# Patient Record
Sex: FEMALE | Race: WHITE | NOT HISPANIC OR LATINO | Employment: FULL TIME | URBAN - METROPOLITAN AREA
[De-identification: names, ages, dates, MRNs, and addresses within clinical notes are randomized per-mention and may not be internally consistent; named-entity substitution may affect disease eponyms.]

---

## 2017-01-24 ENCOUNTER — ALLSCRIPTS OFFICE VISIT (OUTPATIENT)
Dept: OTHER | Facility: OTHER | Age: 37
End: 2017-01-24

## 2017-01-26 ENCOUNTER — GENERIC CONVERSION - ENCOUNTER (OUTPATIENT)
Dept: OTHER | Facility: OTHER | Age: 37
End: 2017-01-26

## 2017-01-27 ENCOUNTER — LAB CONVERSION - ENCOUNTER (OUTPATIENT)
Dept: OTHER | Facility: OTHER | Age: 37
End: 2017-01-27

## 2017-01-27 ENCOUNTER — GENERIC CONVERSION - ENCOUNTER (OUTPATIENT)
Dept: OTHER | Facility: OTHER | Age: 37
End: 2017-01-27

## 2017-01-27 LAB
A. VAGINALIS BY PCR (HISTORICAL): NOT DETECTED
A.VAGINALIS LOG (CELL/ML) (HISTORICAL): <3.25
BVAB 2 (HISTORICAL): NOT DETECTED
C. ALBICANS, DNA OR PCR (HISTORICAL): DETECTED
CANDIDA GENUS (HISTORICAL): DETECTED
CHLAMYDIA, LIQUID-BASED (HISTORICAL): DETECTED
GARDNERELLA BY MOL. METHOD (HISTORICAL): ABNORMAL
GARDNERELLA BY MOL. METHOD (HISTORICAL): DETECTED
LACTOBACILLUS (SPECIES) (HISTORICAL): <3.25
LACTOBACILLUS (SPECIES) (HISTORICAL): NOT DETECTED
MEGASPHAERA TYPE 1 (HISTORICAL): NOT DETECTED
TRICHOMONAS (HISTORICAL): NOT DETECTED

## 2017-02-06 ENCOUNTER — LAB REQUISITION (OUTPATIENT)
Dept: LAB | Facility: HOSPITAL | Age: 37
End: 2017-02-06
Payer: COMMERCIAL

## 2017-02-06 ENCOUNTER — ALLSCRIPTS OFFICE VISIT (OUTPATIENT)
Dept: OTHER | Facility: OTHER | Age: 37
End: 2017-02-06

## 2017-02-06 DIAGNOSIS — N73.0 ACUTE PARAMETRITIS AND PELVIC CELLULITIS: ICD-10-CM

## 2017-02-06 DIAGNOSIS — A74.9 CHLAMYDIAL INFECTION: ICD-10-CM

## 2017-02-06 PROCEDURE — 87086 URINE CULTURE/COLONY COUNT: CPT | Performed by: NURSE PRACTITIONER

## 2017-02-06 PROCEDURE — 87110 CHLAMYDIA CULTURE: CPT | Performed by: NURSE PRACTITIONER

## 2017-02-08 ENCOUNTER — GENERIC CONVERSION - ENCOUNTER (OUTPATIENT)
Dept: OTHER | Facility: OTHER | Age: 37
End: 2017-02-08

## 2017-02-08 LAB — BACTERIA UR CULT: NORMAL

## 2017-02-10 LAB — C TRACH SPEC QL CULT: NEGATIVE

## 2017-02-13 ENCOUNTER — GENERIC CONVERSION - ENCOUNTER (OUTPATIENT)
Dept: OTHER | Facility: OTHER | Age: 37
End: 2017-02-13

## 2017-02-20 ENCOUNTER — ALLSCRIPTS OFFICE VISIT (OUTPATIENT)
Dept: OTHER | Facility: OTHER | Age: 37
End: 2017-02-20

## 2017-02-20 ENCOUNTER — LAB REQUISITION (OUTPATIENT)
Dept: LAB | Facility: HOSPITAL | Age: 37
End: 2017-02-20
Payer: COMMERCIAL

## 2017-02-20 DIAGNOSIS — A74.9 CHLAMYDIAL INFECTION: ICD-10-CM

## 2017-02-20 DIAGNOSIS — N76.0 ACUTE VAGINITIS: ICD-10-CM

## 2017-02-20 PROCEDURE — 87510 GARDNER VAG DNA DIR PROBE: CPT | Performed by: NURSE PRACTITIONER

## 2017-02-20 PROCEDURE — 87110 CHLAMYDIA CULTURE: CPT | Performed by: NURSE PRACTITIONER

## 2017-02-20 PROCEDURE — 87660 TRICHOMONAS VAGIN DIR PROBE: CPT | Performed by: NURSE PRACTITIONER

## 2017-02-20 PROCEDURE — 87480 CANDIDA DNA DIR PROBE: CPT | Performed by: NURSE PRACTITIONER

## 2017-02-21 LAB
CANDIDA RRNA VAG QL PROBE: NEGATIVE
G VAGINALIS RRNA GENITAL QL PROBE: NEGATIVE
T VAGINALIS RRNA GENITAL QL PROBE: NEGATIVE

## 2017-02-23 LAB — C TRACH SPEC QL CULT: NEGATIVE

## 2017-02-24 ENCOUNTER — GENERIC CONVERSION - ENCOUNTER (OUTPATIENT)
Dept: OTHER | Facility: OTHER | Age: 37
End: 2017-02-24

## 2017-03-24 ENCOUNTER — GENERIC CONVERSION - ENCOUNTER (OUTPATIENT)
Dept: OTHER | Facility: OTHER | Age: 37
End: 2017-03-24

## 2017-03-27 ENCOUNTER — ALLSCRIPTS OFFICE VISIT (OUTPATIENT)
Dept: OTHER | Facility: OTHER | Age: 37
End: 2017-03-27

## 2017-03-27 ENCOUNTER — LAB REQUISITION (OUTPATIENT)
Dept: LAB | Facility: HOSPITAL | Age: 37
End: 2017-03-27
Payer: COMMERCIAL

## 2017-03-27 DIAGNOSIS — Z77.21 CONTACT WITH AND (SUSPECTED) EXPOSURE TO POTENTIALLY HAZARDOUS BODY FLUIDS: ICD-10-CM

## 2017-03-27 PROCEDURE — 87591 N.GONORRHOEAE DNA AMP PROB: CPT | Performed by: NURSE PRACTITIONER

## 2017-03-27 PROCEDURE — 87491 CHLMYD TRACH DNA AMP PROBE: CPT | Performed by: NURSE PRACTITIONER

## 2017-03-28 ENCOUNTER — GENERIC CONVERSION - ENCOUNTER (OUTPATIENT)
Dept: OTHER | Facility: OTHER | Age: 37
End: 2017-03-28

## 2017-03-29 LAB
CHLAMYDIA DNA CVX QL NAA+PROBE: NORMAL
N GONORRHOEA DNA GENITAL QL NAA+PROBE: NORMAL

## 2017-03-30 ENCOUNTER — TRANSCRIBE ORDERS (OUTPATIENT)
Dept: LAB | Facility: HOSPITAL | Age: 37
End: 2017-03-30

## 2017-03-30 ENCOUNTER — GENERIC CONVERSION - ENCOUNTER (OUTPATIENT)
Dept: OTHER | Facility: OTHER | Age: 37
End: 2017-03-30

## 2017-03-30 ENCOUNTER — APPOINTMENT (OUTPATIENT)
Dept: LAB | Facility: HOSPITAL | Age: 37
End: 2017-03-30
Payer: COMMERCIAL

## 2017-03-30 DIAGNOSIS — N76.0 ACUTE VAGINITIS: ICD-10-CM

## 2017-03-30 DIAGNOSIS — R10.2 PELVIC AND PERINEAL PAIN: ICD-10-CM

## 2017-03-30 DIAGNOSIS — R30.0 DYSURIA: ICD-10-CM

## 2017-03-30 LAB
BACTERIA UR QL AUTO: NORMAL /HPF
BILIRUB UR QL STRIP: NEGATIVE
CLARITY UR: CLEAR
COLOR UR: YELLOW
GLUCOSE UR STRIP-MCNC: NEGATIVE MG/DL
HGB UR QL STRIP.AUTO: ABNORMAL
HYALINE CASTS #/AREA URNS LPF: NORMAL /LPF
KETONES UR STRIP-MCNC: NEGATIVE MG/DL
LEUKOCYTE ESTERASE UR QL STRIP: NEGATIVE
NITRITE UR QL STRIP: NEGATIVE
NON-SQ EPI CELLS URNS QL MICRO: NORMAL /HPF
PH UR STRIP.AUTO: 6 [PH] (ref 4.5–8)
PROT UR STRIP-MCNC: NEGATIVE MG/DL
RBC #/AREA URNS AUTO: NORMAL /HPF
SP GR UR STRIP.AUTO: 1.03 (ref 1–1.03)
UROBILINOGEN UR QL STRIP.AUTO: 0.2 E.U./DL
WBC #/AREA URNS AUTO: NORMAL /HPF

## 2017-03-30 PROCEDURE — 87086 URINE CULTURE/COLONY COUNT: CPT

## 2017-03-30 PROCEDURE — 81001 URINALYSIS AUTO W/SCOPE: CPT

## 2017-03-31 LAB — BACTERIA UR CULT: NORMAL

## 2017-04-03 ENCOUNTER — GENERIC CONVERSION - ENCOUNTER (OUTPATIENT)
Dept: OTHER | Facility: OTHER | Age: 37
End: 2017-04-03

## 2017-04-04 ENCOUNTER — APPOINTMENT (EMERGENCY)
Dept: ULTRASOUND IMAGING | Facility: HOSPITAL | Age: 37
End: 2017-04-04
Payer: COMMERCIAL

## 2017-04-04 ENCOUNTER — ALLSCRIPTS OFFICE VISIT (OUTPATIENT)
Dept: OTHER | Facility: OTHER | Age: 37
End: 2017-04-04

## 2017-04-04 ENCOUNTER — APPOINTMENT (OUTPATIENT)
Dept: LAB | Facility: HOSPITAL | Age: 37
End: 2017-04-04
Payer: COMMERCIAL

## 2017-04-04 ENCOUNTER — HOSPITAL ENCOUNTER (EMERGENCY)
Facility: HOSPITAL | Age: 37
Discharge: HOME | End: 2017-04-04
Attending: EMERGENCY MEDICINE | Admitting: EMERGENCY MEDICINE
Payer: COMMERCIAL

## 2017-04-04 VITALS
RESPIRATION RATE: 16 BRPM | WEIGHT: 145.5 LBS | BODY MASS INDEX: 24.21 KG/M2 | TEMPERATURE: 98.6 F | SYSTOLIC BLOOD PRESSURE: 149 MMHG | DIASTOLIC BLOOD PRESSURE: 90 MMHG | HEART RATE: 63 BPM | OXYGEN SATURATION: 99 %

## 2017-04-04 DIAGNOSIS — N76.0 ACUTE VAGINITIS: ICD-10-CM

## 2017-04-04 DIAGNOSIS — R10.2 PELVIC AND PERINEAL PAIN: ICD-10-CM

## 2017-04-04 DIAGNOSIS — R10.9 ABDOMINAL PAIN: Primary | ICD-10-CM

## 2017-04-04 LAB
CLARITY, POC: CLEAR
COLOR, POC: YELLOW
EXT BILIRUBIN, UA: NORMAL
EXT BLOOD URINE: NORMAL
EXT GLUCOSE, UA: NORMAL
EXT KETONES: NORMAL
EXT NITRITE, UA: NORMAL
EXT PH, UA: 6
EXT PROTEIN, UA: NORMAL
EXT SPECIFIC GRAVITY, UA: 1.01
EXT UROBILINOGEN: 0.2
HCG UR QL: NEGATIVE
WBC # BLD EST: NORMAL 10*3/UL

## 2017-04-04 PROCEDURE — 87660 TRICHOMONAS VAGIN DIR PROBE: CPT

## 2017-04-04 PROCEDURE — 87510 GARDNER VAG DNA DIR PROBE: CPT

## 2017-04-04 PROCEDURE — 87109 MYCOPLASMA: CPT

## 2017-04-04 PROCEDURE — 87480 CANDIDA DNA DIR PROBE: CPT

## 2017-04-04 PROCEDURE — 81002 URINALYSIS NONAUTO W/O SCOPE: CPT | Performed by: EMERGENCY MEDICINE

## 2017-04-04 PROCEDURE — 76830 TRANSVAGINAL US NON-OB: CPT

## 2017-04-04 PROCEDURE — 76856 US EXAM PELVIC COMPLETE: CPT

## 2017-04-04 PROCEDURE — 99284 EMERGENCY DEPT VISIT MOD MDM: CPT

## 2017-04-04 PROCEDURE — 81025 URINE PREGNANCY TEST: CPT | Performed by: EMERGENCY MEDICINE

## 2017-04-11 LAB
M HOMINIS SPEC QL CULT: NEGATIVE
U UREALYTICUM SPEC QL CULT: POSITIVE

## 2017-04-14 ENCOUNTER — GENERIC CONVERSION - ENCOUNTER (OUTPATIENT)
Dept: OTHER | Facility: OTHER | Age: 37
End: 2017-04-14

## 2017-05-18 ENCOUNTER — LAB REQUISITION (OUTPATIENT)
Dept: LAB | Facility: HOSPITAL | Age: 37
End: 2017-05-18
Payer: COMMERCIAL

## 2017-05-18 ENCOUNTER — ALLSCRIPTS OFFICE VISIT (OUTPATIENT)
Dept: OTHER | Facility: OTHER | Age: 37
End: 2017-05-18

## 2017-05-18 DIAGNOSIS — Z77.21 CONTACT WITH AND (SUSPECTED) EXPOSURE TO POTENTIALLY HAZARDOUS BODY FLUIDS: ICD-10-CM

## 2017-05-18 PROCEDURE — 87491 CHLMYD TRACH DNA AMP PROBE: CPT | Performed by: NURSE PRACTITIONER

## 2017-05-18 PROCEDURE — 87591 N.GONORRHOEAE DNA AMP PROB: CPT | Performed by: NURSE PRACTITIONER

## 2017-05-19 ENCOUNTER — GENERIC CONVERSION - ENCOUNTER (OUTPATIENT)
Dept: OTHER | Facility: OTHER | Age: 37
End: 2017-05-19

## 2017-05-19 LAB
CHLAMYDIA DNA CVX QL NAA+PROBE: NORMAL
N GONORRHOEA DNA GENITAL QL NAA+PROBE: NORMAL

## 2017-06-29 ENCOUNTER — ALLSCRIPTS OFFICE VISIT (OUTPATIENT)
Dept: OTHER | Facility: OTHER | Age: 37
End: 2017-06-29

## 2017-06-29 LAB
BACTERIA UR QL AUTO: NORMAL
CLUE CELL (HISTORICAL): POSITIVE
HYPHAL YEAST (HISTORICAL): NEGATIVE
KOH PREP (HISTORICAL): NORMAL
PH UR STRIP.AUTO: 5.5 [PH]
TRICHOMONAS (HISTORICAL): NEGATIVE
YEAST (HISTORICAL): NEGATIVE

## 2017-06-30 ENCOUNTER — GENERIC CONVERSION - ENCOUNTER (OUTPATIENT)
Dept: OTHER | Facility: OTHER | Age: 37
End: 2017-06-30

## 2017-06-30 ENCOUNTER — LAB REQUISITION (OUTPATIENT)
Dept: LAB | Facility: HOSPITAL | Age: 37
End: 2017-06-30
Payer: COMMERCIAL

## 2017-06-30 DIAGNOSIS — N76.0 ACUTE VAGINITIS: ICD-10-CM

## 2017-06-30 PROCEDURE — 87480 CANDIDA DNA DIR PROBE: CPT | Performed by: PHYSICIAN ASSISTANT

## 2017-06-30 PROCEDURE — 87660 TRICHOMONAS VAGIN DIR PROBE: CPT | Performed by: PHYSICIAN ASSISTANT

## 2017-06-30 PROCEDURE — 87510 GARDNER VAG DNA DIR PROBE: CPT | Performed by: PHYSICIAN ASSISTANT

## 2017-07-01 LAB
CANDIDA RRNA VAG QL PROBE: NEGATIVE
G VAGINALIS RRNA GENITAL QL PROBE: POSITIVE
T VAGINALIS RRNA GENITAL QL PROBE: NEGATIVE

## 2017-10-12 ENCOUNTER — GENERIC CONVERSION - ENCOUNTER (OUTPATIENT)
Dept: OTHER | Facility: OTHER | Age: 37
End: 2017-10-12

## 2017-10-13 ENCOUNTER — GENERIC CONVERSION - ENCOUNTER (OUTPATIENT)
Dept: OTHER | Facility: OTHER | Age: 37
End: 2017-10-13

## 2017-10-13 ENCOUNTER — LAB REQUISITION (OUTPATIENT)
Dept: LAB | Facility: HOSPITAL | Age: 37
End: 2017-10-13
Payer: COMMERCIAL

## 2017-10-13 DIAGNOSIS — N89.8 OTHER SPECIFIED NONINFLAMMATORY DISORDERS OF VAGINA (CODE): ICD-10-CM

## 2017-10-13 PROCEDURE — 87591 N.GONORRHOEAE DNA AMP PROB: CPT | Performed by: NURSE PRACTITIONER

## 2017-10-13 PROCEDURE — 87491 CHLMYD TRACH DNA AMP PROBE: CPT | Performed by: NURSE PRACTITIONER

## 2017-10-16 LAB
CHLAMYDIA DNA CVX QL NAA+PROBE: NORMAL
N GONORRHOEA DNA GENITAL QL NAA+PROBE: NORMAL

## 2017-10-18 ENCOUNTER — GENERIC CONVERSION - ENCOUNTER (OUTPATIENT)
Dept: OTHER | Facility: OTHER | Age: 37
End: 2017-10-18

## 2017-11-03 ENCOUNTER — ALLSCRIPTS OFFICE VISIT (OUTPATIENT)
Dept: OTHER | Facility: OTHER | Age: 37
End: 2017-11-03

## 2017-11-03 LAB
BACTERIA UR QL AUTO: NORMAL
CLUE CELL (HISTORICAL): NORMAL
HYPHAL YEAST (HISTORICAL): NORMAL
KOH PREP (HISTORICAL): NORMAL
PH UR STRIP.AUTO: 4 [PH]
TRICHOMONAS (HISTORICAL): NORMAL
YEAST (HISTORICAL): NORMAL

## 2017-11-23 NOTE — PROGRESS NOTES
Assessment    1  Vaginal discharge (623 5) (N89 8)    Plan  Vaginal discharge    · Fluconazole 150 MG Oral Tablet (Diflucan); diflucan 150mg,,,take 1 today andrepeat in 3 days   Rx By: Scuot gM; Dispense: 0 Days ; #:2 Tablet; Refill: 0;For: Vaginal discharge; MILANA = N; Verified Transmission to [facility] #[id number] Last Updated By: System, [place]; 11/3/2017 11:34:01 AM   · Mitra [place]- POC; Status:Complete;   Done: 54DVE2630 11:27AM   Performed: In Office; LLE:47LRS1570;JGFEUZO; Today;discharge; Ordered By:Lior Ugalde; Discussion/Summary  Discussion Summary:   I asked her to start a probiotic like Rephresh ProB  She will call with any worsening or development of itching  Chief Complaint  Chief Complaint Free Text Note Form: Pt is here c/o vaginal discharge X 2 days  History of Present Illness  HPI: 40year old white female here for evaluation  She notes two days of white vaginal discharge without itching, burning or odor  She was recently treated with Tindamax for BV  She uses [employer] as does her partner  Active Problems  1  Anxiety (300 00) (F41 9)   2  Anxiety disorder (300 00) (F41 9)   3  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)   4  Vaginal discharge (623 5) (N89 8)    Past Medical History  1  History of Chlamydia (079 98) (A74 9)   2  History of  3    Surgical History  1  History of Gallbladder Surgery   2  History of Tubal Ligation    Family History  Mother    1  Family history of migraine headaches (V17 2) (Z82 0)  Father    2  No pertinent family history    Social History     · Always uses seat belt   · Caffeine use (V49 89) (F15 90)   · Denied: History of Drug use   · Exercises regularly   · Feels safe at home   · Former smoker (Y86 72) (E80 214)   · Sexually active   · Three children   · Tubal ligations (V26 51) (Z98 51)    Current Meds   1  Metoprolol Tartrate TABS; Therapy: (Recorded:17Iwq5382) to Recorded    Allergies  1   No Known Drug Allergies    Vitals  Vital Signs    Recorded: 74IFK9260 31:11FD   Systolic 130, LUE, Sitting   Diastolic 96, LUE, Sitting   Weight 138 lb    BMI Calculated 23 32   BSA Calculated 1 68   LMP 28Oct2017       Physical Exam   Constitutional  General appearance: No acute distress, well appearing and well nourished  Genitourinary  External genitalia: Normal and no lesions appreciated  Vagina: Normal, no lesions or dryness appreciated  -- scant white discharge  Urethra: Normal    Urethral meatus: Normal    Bladder: Normal, soft, non-tender and no prolapse or masses appreciated         Results/Data  [facility] 31RTO9498 11:27AM [facility] Frame     Test Name Result Flag Reference   BACTERIA neg     CLUE CELL neg     TRICHOMONAS neg     YEAST WITH HYPHAE neg     YEAST neg     [facility] UA 4 0     KOH PREP neg whiff           Signatures   Electronically signed by : Carl Emery, [facility]; Nov  3 2017 11:28AM EST                       (Author)    Electronically signed by : CHARLES Silva ; Nov 22 2017 10:27AM EST                       (Author)

## 2017-11-28 ENCOUNTER — ALLSCRIPTS OFFICE VISIT (OUTPATIENT)
Dept: OTHER | Facility: OTHER | Age: 37
End: 2017-11-28

## 2017-11-28 LAB
BACTERIA UR QL AUTO: NORMAL
CLUE CELL (HISTORICAL): NORMAL
HYPHAL YEAST (HISTORICAL): NORMAL
KOH PREP (HISTORICAL): NORMAL
PH UR STRIP.AUTO: 3 [PH]
TRICHOMONAS (HISTORICAL): NORMAL
YEAST (HISTORICAL): NORMAL

## 2017-12-04 ENCOUNTER — LAB CONVERSION - ENCOUNTER (OUTPATIENT)
Dept: OTHER | Facility: OTHER | Age: 37
End: 2017-12-04

## 2017-12-04 LAB
A. VAGINALIS BY PCR (HISTORICAL): NOT DETECTED
A.VAGINALIS LOG (CELL/ML) (HISTORICAL): <3.25
BVAB 2 (HISTORICAL): NOT DETECTED
C. ALBICANS, DNA OR PCR (HISTORICAL): NOT DETECTED
C. GLABRATA, DNA OR PCR (HISTORICAL): NOT DETECTED
C. PARAPSILOSIS, DNA OR PCR (HISTORICAL): NOT DETECTED
C. TROPICALIS, DNA OR PCR (HISTORICAL): NOT DETECTED
C.DUBLINIENSIS BY PCR OR DNA (HISTORICAL): NOT DETECTED
C.KRUSEI BY PCR OR DNA (HISTORICAL): NOT DETECTED
CANDIDA GENUS (HISTORICAL): DETECTED
GARDNERELLA BY MOL. METHOD (HISTORICAL): ABNORMAL
GARDNERELLA BY MOL. METHOD (HISTORICAL): DETECTED
LACTOBACILLUS (SPECIES) (HISTORICAL): ABNORMAL
LACTOBACILLUS (SPECIES) (HISTORICAL): DETECTED
MEGASPHAERA TYPE 1 (HISTORICAL): NOT DETECTED
TRICHOMONAS (HISTORICAL): NOT DETECTED

## 2017-12-05 ENCOUNTER — GENERIC CONVERSION - ENCOUNTER (OUTPATIENT)
Dept: OTHER | Facility: OTHER | Age: 37
End: 2017-12-05

## 2017-12-27 ENCOUNTER — GENERIC CONVERSION - ENCOUNTER (OUTPATIENT)
Dept: OTHER | Facility: OTHER | Age: 37
End: 2017-12-27

## 2018-01-09 NOTE — MISCELLANEOUS
Message   Recorded as Task   Date: 04/03/2017 11:44 AM, Created By: Grace Kruger   Task Name: Result Follow Up   Assigned To: Yaakov Paredes   Regarding Patient: Dina Jovel, Status: In Progress   JeramieSadie Gao - 03 Apr 2017 11:44 AM     TASK CREATED  Urine culture is neg   Please notify patient  This was done for pain with urination and pelvic pain  If pelvic pain has persisted please offer an office visit     Thanks   Melissa Maya - 03 Apr 2017 12:01 PM     TASK IN PROGRESS   Melissa Maya - 03 Apr 2017 12:06 PM     TASK EDITED  lm for pt tcb with culture results   Melissa Maya - 03 Apr 2017 1:36 PM     TASK EDITED  lm per pts instructions re results and instructions tcb if persistant pelvic pain        Active Problems    1  Anxiety (300 00) (F41 9)   2  Anxiety disorder (300 00) (F41 9)   3  Dysuria (788 1) (R30 0)   4  Personal history of exposure to potentially hazardous body fluids (V15 85) (Z77 21)   5  Vaginitis (616 10) (N76 0)    Current Meds   1  Ciprofloxacin HCl - 500 MG Oral Tablet; TAKE 1 TABLET EVERY 12 HOURS DAILY; Therapy: 12LED6764 to (Evaluate:02Apr2017)  Requested for: 33CBF5345; Last   Rx:30Mar2017 Ordered   2  Fluconazole 150 MG Oral Tablet; diflucan 150mg,,,take 1 today and repeat in 3 days; Therapy: 58VRA2470 to (Ronda Reese)  Requested for: 70PPF7991; Last   Rx:24Mar2017 Ordered   3  Metoprolol Tartrate TABS; Therapy: (Recorded:63Aie2543) to Recorded   4  Nystatin-Triamcinolone 593071-7 1 UNIT/GM-% External Cream; APPLY SPARINGLY TO   AFFECTED AREA(S) 3 TIMES A DAY; Therapy: 72FZP1385 to (Evaluate:26Apr2017)  Requested for: 10GIN1259; Last   Rx:29Mar2017 Ordered    Allergies    1   No Known Drug Allergies    Signatures   Electronically signed by : Dominique Srivastava, ; Apr  3 2017  1:37PM EST                       (Author)

## 2018-01-09 NOTE — MISCELLANEOUS
Message   Recorded as Task   Date: 03/28/2017 05:05 PM, Created By: Jey Wick   Task Name: Call Back   Assigned To: Francisco Hamilton   Regarding Patient: Elzbieta Yañez, Status: In Progress   Comment:    Yany Malone - 28 Mar 2017 5:05 PM     TASK CREATED  Caller: Self; Results Inquiry; [phone] (Home)  pt calling for her std test results,  pls call pt @ [phone]   [facility] - 28 Mar 2017 5:14 PM     TASK IN PROGRESS   Melissa Maya - 28 Mar 2017 5:20 PM     TASK EDITED  pt is calling for cultures done yesterday    explained not available yet   pt states lq pain has increased suince she was seen    white-yellow disch continues    more than normal amt    she is req an rx   Melissa Maya - 28 Mar 2017 5:21 PM     TASK REASSIGNED: Previously Assigned To Nader Aguilar - 29 Mar 2017 8:40 AM     TASK REPLIED TO: Previously Assigned To [facility]  Please encourage her to hang in there for today - hopefully we will get the results back, if not we will call the lab later this afternoon  I will absolutely call in something for her, I just want to make sure its the correct med for her problem  Please reassure her that there is no risk to waiting a few more hours  She didn't have pain when I saw her - just discharge and irritation and she is already s/p diflucan  There is an anxiety component with her complaints related to recent chlamydia - tough to triage by phone but her exam was ok and she had no features of chl or PID  Thanks   Federico Larios - 29 Mar 2017 9:59 AM     TASK EDITED  gc and chlamydia not done as yet  I called lab   Abigail Sanchez - 30 Mar 2017 8:54 AM     TASK EDITED  RI7 called in a script of Nystatin-Triamcinolone and advised pelvic rest and vulvar hygiene  Message is copied into a note  Active Problems    1  Anxiety (300 00) (F41 9)   2  Anxiety disorder (300 00) (F41 9)   3   Personal history of exposure to potentially hazardous body fluids (V15 85) (Z77 21)   4  Vaginitis (616 10) (N76 0)    Current Meds   1  Fluconazole 150 MG Oral Tablet; diflucan 150mg,,,take 1 today and repeat in 3 days; Therapy: 45YCM4275 to (Mia Kirby)  Requested for: 25CCV2643; Last   Rx:24Mar2017 Ordered   2  Metoprolol Tartrate TABS; Therapy: (Recorded:86Vrr4391) to Recorded   3  Nystatin-Triamcinolone 392656-0 1 UNIT/GM-% External Cream; APPLY SPARINGLY TO   AFFECTED AREA(S) 3 TIMES A DAY; Therapy: 20UVX1388 to (Evaluate:26Apr2017)  Requested for: 17JPP9629; Last   Rx:29Mar2017 Ordered    Allergies    1   No Known Drug Allergies    Signatures   Electronically signed by : Sudhir Hayes, ; Mar 30 2017  8:54AM EST                       (Author)

## 2018-01-10 NOTE — MISCELLANEOUS
Message   Recorded as Task   Date: 06/30/2017 12:43 PM, Created By: Rj Mobley   Task Name: Care Coordination   Assigned To: Carey Kunz   Regarding Patient: Mara Lockhart, Status: In Progress   Lena Lawrence - 30 Jun 2017 12:43 PM     TASK CREATED  Caller: Self; Care Coordination; [phone] (Home)  Patient was seen by [address] [facility] [address] [facility] last evening  Note states that RX sent in but I don't see it in there  She went to pharmacy and its not there  She said it was supposed to be for Joselyn Mariee - 30 Jun 2017 12:44 PM     TASK IN PROGRESS   Melissa Maya - 30 Jun 2017 12:50 PM     TASK REASSIGNED: Previously Assigned To [facility],Team  pt is waiting rx that was to be sent to the pharm yesterday   Peewee Roland - 30 Jun 2017 1:13 PM     TASK REPLIED TO: Previously Assigned To [facility] care of  Please call patient back to inform her I sent ERx  Melissa Maya - 30 Jun 2017 2:07 PM     TASK EDITED  lm for pt that rx available        Active Problems    1  Acute vaginitis (616 10) (N76 0)   2  Anxiety (300 00) (F41 9)   3  Anxiety disorder (300 00) (F41 9)   4  Bacterial vaginitis (616 10,041 9) (N76 0,B96 89)   5  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)   6  Dysuria (788 1) (R30 0)   7  Encounter for gynecological examination without abnormal finding (V72 31) (Z01 419)   8  Foul smelling vaginal discharge (623 5) (N89 8)   9  Pelvic cramping (625 9) (R10 2)   10  Personal history of exposure to potentially hazardous body fluids (V15 85) (Z77 21)   11  Screening for HPV (human papillomavirus) (V73 81) (Z11 51)   12  Vaginitis (616 10) (N76 0)    Current Meds   1  Metoprolol Tartrate TABS; Therapy: (Recorded:55Udd8027) to Recorded    Allergies    1   No Known Drug Allergies    Plan  Bacterial vaginitis    · MetroNIDAZOLE 0 75 % Vaginal Gel; INSERT 1 APPLICATORFUL  INTRAVAGINALLY AT BEDTIME NIGHTLY    Signatures   Electronically signed by : Ramy Guerra, ; Jun 30 2017  2:07PM EST                       (Author)

## 2018-01-10 NOTE — MISCELLANEOUS
Message   Recorded as Task   Date: 10/18/2017 02:33 PM, Created By: Vicky Stanley   Task Name: Result Follow Up   Assigned To: Kavitha Anne   Regarding Patient: Hugo Lucio, Status: In Progress   JeramieRanjit Aponte - 18 Oct 2017 2:33 PM     TASK CREATED  Neg gc/chl  Please notify patient by cell with results     Thanks   Qiana  - 18 Oct 2017 2:35 PM     TASK IN PROGRESS   Qiana  - 18 Oct 2017 2:37 PM     TASK EDITED  lft mesg on cell        Active Problems    1  Acute vaginitis (616 10) (N76 0)   2  Anxiety (300 00) (F41 9)   3  Anxiety disorder (300 00) (F41 9)   4  Bacterial vaginitis (616 10,041 9) (N76 0,B96 89)   5  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)   6  Dysuria (788 1) (R30 0)   7  Foul smelling vaginal discharge (623 5) (N89 8)   8  Pelvic cramping (625 9) (R10 2)   9  Personal history of exposure to potentially hazardous body fluids (V15 85) (Z77 21)   10  Vaginal discharge (623 5) (N89 8)   11  Vaginitis (616 10) (N76 0)    Current Meds   1  Fluconazole 150 MG Oral Tablet; diflucan 150mg,,,take 1 today and repeat in 3 days; Therapy: 66JAT5660 to (96 663582)  Requested for: 14BJK0075; Last   Rx:11Oct2017 Ordered   2  Metoprolol Tartrate TABS; Therapy: (Recorded:97Vna3555) to Recorded   3  Tinidazole 500 MG Oral Tablet; Four tablets once daily for two days; Therapy: 88LNN5776 to (Evaluate:15Oct2017); Last Rx:13Oct2017 Ordered    Allergies    1   No Known Drug Allergies    Signatures   Electronically signed by : Bakari Palmer, ; Oct 18 2017  2:37PM EST                       (Author)

## 2018-01-11 NOTE — MISCELLANEOUS
Message   Recorded as Task   Date: 02/13/2017 07:49 AM, Created By: Bridgette Lopez   Task Name: Result Follow Up   Assigned To: Daria Perera   Regarding Patient: Ines Ramires, Status: Active   CommentAbraham Hdez - 13 Feb 2017 7:49 AM     TASK CREATED   Kevin Eagle - 13 Feb 2017 10:04 AM     TASK EDITED  I lm of this for pt        Active Problems    1  Anxiety disorder (300 00) (F41 9)   2  Chest pressure (786 59) (R07 89)   3  Chlamydia (079 98) (A74 9)   4  Low back pain (724 2) (M54 5)   5  Palpitations (785 1) (R00 2)   6  Pelvic pain (R10 2)   7  Personal history of exposure to potentially hazardous body fluids (V15 85) (Z77 21)   8  PID (acute pelvic inflammatory disease) (614 3) (N73 0)    Current Meds   1  Doxycycline Hyclate 100 MG Oral Tablet; TAKE 1 TABLET TWICE DAILY; Therapy: 17CIZ5226 to (Evaluate:74Zrm0961)  Requested for: 97IRR8428; Last   Rx:49Nnl6306 Ordered   2  Metoprolol Tartrate TABS; Therapy: (Recorded:06Feb2017) to Recorded    Allergies    1  No Known Drug Allergies    Signatures   Electronically signed by :  Shalom Arevalo, ; Feb 13 2017 10:04AM EST                       (Author)

## 2018-01-11 NOTE — MISCELLANEOUS
Message   Recorded as Task   Date: 03/29/2017 04:23 PM, Created By: Devon Mahmood   Task Name: Co-Sign Note   Assigned To: Compa Frye   Regarding Patient: Bowen Gonzales, Status: In Progress   Ayad Renteriaon - 29 Mar 2017 4:23 PM     TASK CREATED  Please notify patient that labs are still not resulted  I will send in an rx for nystatin triamcin cream for her to use today  Please advise conservative vulvar hygiene and pelvic rest  We will be in touch when results are available  LauraAbigail - 30 Mar 2017 8:42 AM     TASK IN PROGRESS   LauraAbigail - 30 Mar 2017 8:44 AM     TASK EDITED  lmtcb   Abigail Sanchez - 30 Mar 2017 8:49 AM     TASK EDITED  advsd pt of RI7 instructions  Advsd will cb when we have the results of her tests  Active Problems    1  Anxiety (300 00) (F41 9)   2  Anxiety disorder (300 00) (F41 9)   3  Personal history of exposure to potentially hazardous body fluids (V15 85) (Z77 21)   4  Vaginitis (616 10) (N76 0)    Current Meds   1  Fluconazole 150 MG Oral Tablet; diflucan 150mg,,,take 1 today and repeat in 3 days; Therapy: 27GZD4304 to (Mathew Rice)  Requested for: 66RRT3068; Last   Rx:24Mar2017 Ordered   2  Metoprolol Tartrate TABS; Therapy: (Recorded:77Rpt5209) to Recorded   3  Nystatin-Triamcinolone 616322-2 1 UNIT/GM-% External Cream; APPLY SPARINGLY TO   AFFECTED AREA(S) 3 TIMES A DAY; Therapy: 65LUI9027 to (Evaluate:26Apr2017)  Requested for: 46BBH1432; Last   Rx:29Mar2017 Ordered    Allergies    1   No Known Drug Allergies    Signatures   Electronically signed by : June Gomez, ; Mar 30 2017  8:49AM EST                       (Author)

## 2018-01-11 NOTE — MISCELLANEOUS
Message   Recorded as Task   Date: 03/29/2017 04:23 PM, Created By: Bridgette Lopez   Task Name: Co-Sign Note   Assigned To: Daria Perera   Regarding Patient: Ines Ramires, Status: In Progress   Gian Hdez - 29 Mar 2017 4:23 PM     TASK CREATED  Please notify patient that labs are still not resulted  I will send in an rx for nystatin triamcin cream for her to use today  Please advise conservative vulvar hygiene and pelvic rest  We will be in touch when results are available  Abigail Sanchez - 30 Mar 2017 8:42 AM     TASK IN PROGRESS   Abigail Sanchez - 30 Mar 2017 8:44 AM     TASK EDITED  lmtcb   Abigail Sanchez - 30 Mar 2017 8:49 AM     TASK EDITED  advsd pt of RI7 instructions  Advsd will cb when we have the results of her tests  Active Problems    1  Anxiety (300 00) (F41 9)   2  Anxiety disorder (300 00) (F41 9)   3  Personal history of exposure to potentially hazardous body fluids (V15 85) (Z77 21)   4  Vaginitis (616 10) (N76 0)    Current Meds   1  Fluconazole 150 MG Oral Tablet; diflucan 150mg,,,take 1 today and repeat in 3 days; Therapy: 05OWB8924 to (Kita Patel)  Requested for: 11HVM9681; Last   Rx:24Mar2017 Ordered   2  Metoprolol Tartrate TABS; Therapy: (Recorded:06Ifw5043) to Recorded   3  Nystatin-Triamcinolone 717626-9 1 UNIT/GM-% External Cream; APPLY SPARINGLY TO   AFFECTED AREA(S) 3 TIMES A DAY; Therapy: 71FEN7385 to (Evaluate:26Apr2017)  Requested for: 31OYS5744; Last   Rx:29Mar2017 Ordered    Allergies    1   No Known Drug Allergies    Signatures   Electronically signed by : Lizzy Contreras, ; Mar 30 2017  8:50AM EST                       (Author)

## 2018-01-12 NOTE — MISCELLANEOUS
Message   Recorded as Task   Date: 01/26/2017 12:27 PM, Created By: Salma Conrad   Task Name: Call Back   Assigned To: Yaakov Paredes   Regarding Patient: Dina Jovel, Status: In Progress   Comment:    Amairani Carroll - 26 Jan 2017 12:27 PM     TASK CREATED  Caller: Self; Results Inquiry; [phone] (Home)  Pt calling for results from 1/24 visit   Roxene Seat - 26 Jan 2017 12:57 PM     TASK IN [facility] - 26 Jan 2017 12:59 PM     TASK EDITED  I let pt know test results aren't back yet and maybe try calling the middle of next week for results  they can take up to 2 weeks to come in        Active Problems    1  Anxiety disorder (300 00) (F41 9)   2  Candidiasis (112 9) (B37 9)   3  Chest pressure (786 59) (R07 89)   4  Palpitations (785 1) (R00 2)   5  Personal history of exposure to potentially hazardous body fluids (V15 85) (Z77 21)    Current Meds   1  Fluconazole 150 MG Oral Tablet; diflucan 150mg,,,take 1 today and repeat in 3 days; Therapy: 04KFI2706 to (Evaluate:28Jan2017)  Requested for: 06PYM5991; Last   Rx:24Jan2017 Ordered   2  Nystatin-Triamcinolone 839613-0 1 UNIT/GM-% External Cream; APPLY SPARINGLY TO   AFFECTED AREA(S) 3 TIMES A DAY; Therapy: 61TEN3897 to (Evaluate:37Xgl2943)  Requested for: 33WDA1869; Last   Rx:24Jan2017 Ordered    Allergies    1   No Known Drug Allergies    Signatures   Electronically signed by : Dina Jovel, ; Jan 26 2017  1:00PM EST                       (Author)

## 2018-01-13 VITALS
SYSTOLIC BLOOD PRESSURE: 108 MMHG | HEIGHT: 65 IN | WEIGHT: 141 LBS | BODY MASS INDEX: 23.49 KG/M2 | DIASTOLIC BLOOD PRESSURE: 62 MMHG

## 2018-01-13 VITALS
HEIGHT: 65 IN | WEIGHT: 139 LBS | SYSTOLIC BLOOD PRESSURE: 124 MMHG | BODY MASS INDEX: 23.16 KG/M2 | DIASTOLIC BLOOD PRESSURE: 78 MMHG

## 2018-01-13 VITALS — BODY MASS INDEX: 22.96 KG/M2 | DIASTOLIC BLOOD PRESSURE: 96 MMHG | SYSTOLIC BLOOD PRESSURE: 142 MMHG | WEIGHT: 138 LBS

## 2018-01-13 VITALS
BODY MASS INDEX: 22.82 KG/M2 | HEIGHT: 65 IN | WEIGHT: 137 LBS | DIASTOLIC BLOOD PRESSURE: 80 MMHG | SYSTOLIC BLOOD PRESSURE: 120 MMHG

## 2018-01-13 VITALS
WEIGHT: 136 LBS | HEIGHT: 65 IN | SYSTOLIC BLOOD PRESSURE: 112 MMHG | DIASTOLIC BLOOD PRESSURE: 78 MMHG | BODY MASS INDEX: 22.66 KG/M2

## 2018-01-13 VITALS
BODY MASS INDEX: 23.66 KG/M2 | WEIGHT: 142 LBS | DIASTOLIC BLOOD PRESSURE: 66 MMHG | HEIGHT: 65 IN | SYSTOLIC BLOOD PRESSURE: 102 MMHG

## 2018-01-13 NOTE — MISCELLANEOUS
Message   Recorded as Task   Date: 03/24/2017 07:07 AM, Created By: Emely López   Task Name: Medical Complaint Callback   Assigned To: Marsabrinajoan Perera   Regarding Patient: Ines Ramires, Status: In Progress   Comment:    Melissa Negro - 24 Mar 2017 7:07 AM     TASK CREATED  Caller: Self; [phone] (Home)  pt called in regards to symptoms of a yeast inf  please advise [facility] pt @ [phone]   Rosangela Lopez - 24 Mar 2017 8:25 AM     TASK IN PROGRESS   Rosangela Lopez - 24 Mar 2017 8:29 AM     TASK EDITED  Spoke with Romy Poster with possible yeast infection symptoms  She sent an RX for Difulcan -2 doses ,4 days apart to her pharmacy  Awaiting call from Brandi Ching to confirm symptoms  Rosangela Lopez - 24 Mar 2017 9:29 AM     TASK EDITED  C/O Vaginal itching, sore, white discharge, red  Rx was called in  If symptoms persist please call the office for an apt  Active Problems    1  Anxiety disorder (300 00) (F41 9)   2  Chest pressure (786 59) (R07 89)   3  Chlamydia (079 98) (A74 9)   4  Low back pain (724 2) (M54 5)   5  Palpitations (785 1) (R00 2)   6  Pelvic pain (R10 2)   7  Personal history of exposure to potentially hazardous body fluids (V15 85) (Z77 21)   8  Vaginitis (616 10) (N76 0)    Current Meds   1  Doxycycline Hyclate 100 MG Oral Tablet; TAKE 1 TABLET TWICE DAILY; Therapy: 65OFN8167 to (Evaluate:20Eny7672)  Requested for: 53VRQ3679; Last   Rx:07Ifs4693 Ordered   2  Fluconazole 150 MG Oral Tablet; diflucan 150mg,,,take 1 today and repeat in 3 days; Therapy: 12RPJ9573 to (Meka Hartley)  Requested for: 57OYZ8853; Last   Rx:24Mar2017 Ordered   3  Metoprolol Tartrate TABS; Therapy: (Recorded:54Mzb9243) to Recorded    Allergies    1   No Known Drug Allergies    Signatures   Electronically signed by : Briseida Pinto, ; Mar 24 2017  9:30AM EST                       (Author)

## 2018-01-14 VITALS
BODY MASS INDEX: 23.66 KG/M2 | SYSTOLIC BLOOD PRESSURE: 110 MMHG | HEIGHT: 65 IN | WEIGHT: 142 LBS | DIASTOLIC BLOOD PRESSURE: 68 MMHG

## 2018-01-14 NOTE — MISCELLANEOUS
Message   Recorded as Task   Date: 03/30/2017 12:26 PM, Created By: Clara Zuleta   Task Name: Result Follow Up   Assigned To: Tuyet Martínez   Regarding Patient: Roselyn Valera, Status: In Progress   CommentChancenza Dubin - 30 Mar 2017 12:26 PM     TASK CREATED  Please notify patient that gc and chl are neg  Please provide reassurance re: accuracy of the DNA testing  She may f/u PRN   Abigail Sanchez - 30 Mar 2017 12:28 PM     TASK IN PROGRESS   Abigail Sanchez - 30 Mar 2017 12:49 PM     TASK EDITED  Notified pt of results  Is happy they are neg, but is still having abdominal pain - the burning during urination is worse and has small amount of thick white discharge    Please advise  Thanks! Valentine Willis - 30 Mar 2017 1:12 PM     TASK REPLIED TO: Previously Assigned To Clara Zuleta  She had no burning on urination when I saw her for her visit  Please advise that she completes a urinalysis and C&S (I will enter orders) and starts a course of Cipro, which I will eRx  Please advise submitting urine BEFORE starting cipro  If symptoms do not improve she should be seen  Thanks   Abigail Sanchez - 30 Mar 2017 1:20 PM     TASK EDITED  Called pt back - per Марина Flores - to get urinalysis and C&S done, and that she also sent a script to the pharmacy for her  Reiterated not to take the Cipro before the testing  To start taking it after  Stated she will go today  Active Problems    1  Anxiety (300 00) (F41 9)   2  Anxiety disorder (300 00) (F41 9)   3  Dysuria (788 1) (R30 0)   4  Personal history of exposure to potentially hazardous body fluids (V15 85) (Z77 21)   5  Vaginitis (616 10) (N76 0)    Current Meds   1  Ciprofloxacin HCl - 500 MG Oral Tablet; TAKE 1 TABLET EVERY 12 HOURS DAILY; Therapy: 22KNN8195 to (Evaluate:02Apr2017)  Requested for: 66HUK4640; Last   Rx:30Mar2017 Ordered   2  Fluconazole 150 MG Oral Tablet; diflucan 150mg,,,take 1 today and repeat in 3 days;    Therapy: 72RVN2027 to ([facility])  Requested for: 02PVI3665; Last   Rx:24Mar2017 Ordered   3  Metoprolol Tartrate TABS; Therapy: (Recorded:81Bjc6786) to Recorded   4  Nystatin-Triamcinolone 635776-7 1 UNIT/GM-% External Cream; APPLY SPARINGLY TO   AFFECTED AREA(S) 3 TIMES A DAY; Therapy: 71VLW6841 to (Evaluate:26Apr2017)  Requested for: 62SJT9070; Last   Rx:29Mar2017 Ordered    Allergies    1   No Known Drug Allergies    Signatures   Electronically signed by : Shirley Carrasco, ; Mar 30 2017  1:20PM EST                       (Author)

## 2018-01-14 NOTE — MISCELLANEOUS
Message   Recorded as Task   Date: 02/24/2017 01:13 PM, Created By: Mary Padilla   Task Name: Result Follow Up   Assigned To: Shira Messer   Regarding Patient: Pooja Shah, Status: In Progress   CommentLawrance Catena - 24 Feb 2017 1:13 PM     TASK CREATED  Neg chl culture and vaginitis panel   Please notify patient   Thanks   Melissa Maya - 24 Feb 2017 1:24 PM     TASK IN PROGRESS   Melissa Maya - 24 Feb 2017 1:27 PM     TASK EDITED  lm for pt re results        Active Problems    1  Anxiety disorder (300 00) (F41 9)   2  Chest pressure (786 59) (R07 89)   3  Chlamydia (079 98) (A74 9)   4  Low back pain (724 2) (M54 5)   5  Palpitations (785 1) (R00 2)   6  Pelvic pain (R10 2)   7  Personal history of exposure to potentially hazardous body fluids (V15 85) (Z77 21)   8  Vaginitis (616 10) (N76 0)    Current Meds   1  Doxycycline Hyclate 100 MG Oral Tablet; TAKE 1 TABLET TWICE DAILY; Therapy: 43TWT6021 to (Evaluate:13Ibq2137)  Requested for: 00ENN1234; Last   Rx:20Uoh5924 Ordered   2  Metoprolol Tartrate TABS; Therapy: (Recorded:29Rrj0917) to Recorded    Allergies    1   No Known Drug Allergies    Signatures   Electronically signed by : Tia Rouse, ; Feb 24 2017  1:28PM EST                       (Author)

## 2018-01-14 NOTE — MISCELLANEOUS
Message   Recorded as Task   Date: 10/11/2017 12:56 PM, Created By: Zacarias Sanford   Task Name: Medical Complaint Callback   Assigned To: Abigail Sanchez   Regarding Patient: Karmen Kawasaki, Status: In Progress   Comment:    Melissa Negro - 11 Oct 2017 12:56 PM     TASK CREATED  Caller: Self; [phone] (Mobile Phone)  pt called in regards to yeast inf  please advise [facility] [phone] [facility] - 11 Oct 2017 1:40 PM     TASK IN PROGRESS   Abigail Sanchez - 11 Oct 2017 1:46 PM     TASK EDITED  [facility] for pt to cb       ext: [phone]   Abigail Sanchez - 11 Oct 2017 2:09 PM     TASK EDITED  Patient returned call - symptoms started yesterday  States she get yeast infections often  She has itching, thick white discharge and burning when urine touches skin  No odor  Feels sore  She has not used anything OTC, wants Diflucan  OK to fill? Please advise  Thanks! Safia Bergere - 11 Oct 2017 4:38 PM     TASK REPLIED TO: Previously Assigned To Safia Matthew  Rx sent for Dilfucan  F/u PRN if sx not improved   Abigail Sanchez - 11 Oct 2017 5:13 PM     TASK EDITED  Per HIPPA - lm that rx was sent tot pharmacy  If sx do not improve 48 hrs after last pill taken to cb     ext: [id number]   Abigail Sanchez - 12 Oct 2017 10:21 AM     TASK EDITED  Per HIPPA form - lm that Rx was sent to pharmacy  Gave directions on use and advised if sx do not improve 48 hrs after last pill taken to cb for an appointment  Active Problems    1  Acute vaginitis (616 10) (N76 0)   2  Anxiety (300 00) (F41 9)   3  Anxiety disorder (300 00) (F41 9)   4  Bacterial vaginitis (616 10,041 9) (N76 0,B96 89)   5  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)   6  Dysuria (788 1) (R30 0)   7  Encounter for gynecological examination without abnormal finding (V72 31) (Z01 419)   8  Foul smelling vaginal discharge (623 5) (N89 8)   9  Pelvic cramping (625 9) (R10 2)   10   Personal history of exposure to potentially hazardous body fluids (V15 85) (Z77 21)   11  Screening for HPV (human papillomavirus) (V73 81) (Z11 51)   12  Vaginitis (616 10) (N76 0)    Current Meds   1  Fluconazole 150 MG Oral Tablet; diflucan 150mg,,,take 1 today and repeat in 3 days; Therapy: 55PVT3781 to (0688 816 15 23)  Requested for: 89GFO0969; Last   Rx:11Oct2017 Ordered   2  Metoprolol Tartrate TABS; Therapy: (Recorded:89Caw2151) to Recorded   3  MetroNIDAZOLE 0 75 % Vaginal Gel; INSERT 1 APPLICATORFUL INTRAVAGINALLY   AT BEDTIME NIGHTLY; Therapy: 79QFC1383 to (Evaluate:23Zwz7844)  Requested for: 76Too9340; Last   Rx:14Jkd1839 Ordered    Allergies    1   No Known Drug Allergies    Signatures   Electronically signed by : Roly Hamlin, ; Oct 12 2017 10:21AM EST                       (Author)

## 2018-01-15 VITALS — WEIGHT: 134 LBS | SYSTOLIC BLOOD PRESSURE: 128 MMHG | DIASTOLIC BLOOD PRESSURE: 74 MMHG | BODY MASS INDEX: 22.65 KG/M2

## 2018-01-15 VITALS
DIASTOLIC BLOOD PRESSURE: 72 MMHG | SYSTOLIC BLOOD PRESSURE: 118 MMHG | WEIGHT: 138 LBS | HEIGHT: 65 IN | BODY MASS INDEX: 22.99 KG/M2

## 2018-01-15 NOTE — MISCELLANEOUS
Message   Recorded as Task   Date: 04/14/2017 10:23 AM, Created By: Vicky Stanley   Task Name: Result Follow Up   Assigned To: Kavitha Anne   Regarding Patient: Hugo Lucio, Status: In Progress   CommentRanjit Aponte - 14 Apr 2017 10:23 AM     TASK CREATED  Ureaplasma is pos - this may be related to colonization or recent exposure - difficult to determine  I would advise a dose of Azithromycin 1gm x1 to cover this (I will eRx)  Please encourage completion of pelvic US as discussed     Thanks   Melissa Maya - 14 Apr 2017 10:49 AM     TASK IN PROGRESS   Melissa Maya - 14 Apr 2017 10:50 AM     TASK EDITED  lm for pt tcb for results   Melissa Maya - 14 Apr 2017 11:04 AM     TASK EDITED  pt inf results    [facility]    she will begin rx        Active Problems    1  Anxiety (300 00) (F41 9)   2  Anxiety disorder (300 00) (F41 9)   3  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)   4  Dysuria (788 1) (R30 0)   5  Pelvic cramping (625 9) (R10 2)   6  Personal history of exposure to potentially hazardous body fluids (V15 85) (Z77 21)   7  Vaginitis (616 10) (N76 0)    Current Meds   1  Azithromycin 500 MG Oral Tablet; TAKE 2 TABS (1GM) BY MOUTH NOW;   Therapy: 14Apr2017 to (Evaluate:15Apr2017)  Requested for: 14Apr2017; Last   Rx:14Apr2017 Ordered   2  Metoprolol Tartrate TABS; Therapy: (Recorded:77Ayw8196) to Recorded    Allergies    1   No Known Drug Allergies    Signatures   Electronically signed by : Nitin Nicolas, ; Apr 14 2017 11:04AM EST                       (Author)

## 2018-01-15 NOTE — MISCELLANEOUS
Message   Recorded as Task   Date: 02/08/2017 01:42 PM, Created By: Higinio Hilliard   Task Name: Result Follow Up   Assigned To: Blaze Parish   Regarding Patient: Babatunde Metz, Status: Active   CommentGlendia Blind - 08 Feb 2017 1:42 PM     TASK CREATED  Neg urine culture  No further treatment is indicated  Please notify patient by phone re: these normal results and check on her status - she was treated for PID this week  Please confirm that her status is stabe or improved - if not she may be seen PRN  Thanks! Manuelita Gitelman - 08 Feb 2017 1:48 PM     TASK EDITED  lmtcb   Manuelita Gitelman - 08 Feb 2017 2:12 PM     TASK EDITED  Pt said she is not better but does not want an ov at this time  She has low back pain and feels she has a uti - urine clear  I offered pt appt  but she wants to wait        Active Problems    1  Anxiety disorder (300 00) (F41 9)   2  Chest pressure (786 59) (R07 89)   3  Chlamydia (079 98) (A74 9)   4  Low back pain (724 2) (M54 5)   5  Palpitations (785 1) (R00 2)   6  Pelvic pain (R10 2)   7  Personal history of exposure to potentially hazardous body fluids (V15 85) (Z77 21)   8  PID (acute pelvic inflammatory disease) (614 3) (N73 0)    Current Meds   1  Doxycycline Hyclate 100 MG Oral Tablet; TAKE 1 TABLET TWICE DAILY; Therapy: 09DLQ1053 to (Evaluate:17Dev4710)  Requested for: 32MSD8180; Last   Rx:13Cpw1804 Ordered   2  Metoprolol Tartrate TABS; Therapy: (Recorded:70Jrd8505) to Recorded    Allergies    1  No Known Drug Allergies    Signatures   Electronically signed by :  Ml Arevalo, ; Feb 8 2017  2:13PM EST                       (Author)

## 2018-01-16 NOTE — MISCELLANEOUS
Message   Recorded as Task   Date: 04/14/2017 11:48 AM, Created By: Bing Lockhart   Task Name: Care Coordination   Assigned To: Mackst File   Regarding Patient: Tiara Cardenas, Status: In Progress   CommentCosimo Knife - 14 Apr 2017 11:48 AM     TASK CREATED  Pelvic US is essentially WNL however there is a small amt of fluid present which may be suggestive of recent ovarian cyst rupture   Please notify patient and inquire about her comfort level  Please also encourage completion of azithro for ureaplasma + culture     F/u PRN   Melissa Maya - 14 Apr 2017 12:31 PM     TASK IN PROGRESS   Melissa Maya - 14 Apr 2017 12:32 PM     TASK EDITED  lm with pt re u/s result        Active Problems    1  Anxiety (300 00) (F41 9)   2  Anxiety disorder (300 00) (F41 9)   3  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)   4  Dysuria (788 1) (R30 0)   5  Pelvic cramping (625 9) (R10 2)   6  Personal history of exposure to potentially hazardous body fluids (V15 85) (Z77 21)   7  Vaginitis (616 10) (N76 0)    Current Meds   1  Azithromycin 500 MG Oral Tablet; TAKE 2 TABS (1GM) BY MOUTH NOW;   Therapy: 14Apr2017 to (Evaluate:15Apr2017)  Requested for: 14Apr2017; Last   Rx:14Apr2017 Ordered   2  Metoprolol Tartrate TABS; Therapy: (Recorded:66Jdb6458) to Recorded    Allergies    1   No Known Drug Allergies    Signatures   Electronically signed by : Marshia Barthel, ; Apr 14 2017 12:32PM EST                       (Author)

## 2018-01-16 NOTE — MISCELLANEOUS
Message   Recorded as Task   Date: 03/28/2017 05:05 PM, Created By: Ines Delarosa   Task Name: Call Back   Assigned To: Melanie Mcfarland   Regarding Patient: Hoda Thomas, Status: In Progress   Comment:    Yany Malone - 28 Mar 2017 5:05 PM     TASK CREATED  Caller: Self; Results Inquiry; [phone] (Home)  pt calling for her std test results,  pls call pt @ [phone]   [facility] - 28 Mar 2017 5:14 PM     TASK IN PROGRESS   Melissa Maya - 28 Mar 2017 5:20 PM     TASK EDITED  pt is calling for cultures done yesterday    explained not available yet   pt states lq pain has increased suince she was seen    white-yellow disch continues    more than normal amt    she is req an rx        Active Problems    1  Anxiety (300 00) (F41 9)   2  Anxiety disorder (300 00) (F41 9)   3  Personal history of exposure to potentially hazardous body fluids (V15 85) (Z77 21)   4  Vaginitis (616 10) (N76 0)    Current Meds   1  Fluconazole 150 MG Oral Tablet; diflucan 150mg,,,take 1 today and repeat in 3 days; Therapy: 22ORW9975 to (Jose Wallace)  Requested for: 02JOX8778; Last   Rx:24Mar2017 Ordered   2  Metoprolol Tartrate TABS; Therapy: (Recorded:24Ypv9206) to Recorded    Allergies    1   No Known Drug Allergies    Signatures   Electronically signed by : Manish Laguerre, ; Mar 28 2017  5:20PM EST                       (Author)

## 2018-01-16 NOTE — MISCELLANEOUS
Message   Recorded as Task   Date: 05/19/2017 01:37 PM, Created By: Mary Padilla   Task Name: Result Follow Up   Assigned To: Shira Messer   Regarding Patient: Pooja Shah, Status: In Progress   CommentLawrance Barbieena - 19 May 2017 1:37 PM     TASK CREATED  Gonorrhea and chlamydia are neg  Please notify patient   Advise continuing to observe, as per our plan discussed at office visit     Thanks   Toney Johnson - 19 May 2017 1:40 PM     TASK IN PROGRESS   Abigail Sanchez - 19 May 2017 1:43 PM     TASK EDITED  LMOM to iam [facility] 8084 2:03 PM     TASK EDITED  Patient aware of results  Active Problems    1  Anxiety (300 00) (F41 9)   2  Anxiety disorder (300 00) (F41 9)   3  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

## 2018-01-17 NOTE — PROGRESS NOTES
Assessment    1  Vaginal discharge (623 5) (N89 8)    Plan  Vaginal discharge    · 97 Erinn Musa; Status:Complete;   Done: 75HGC4470 12:11PM   Performed: In Office; CPC:04HKI3843;AXEJMZD; Today; For:Vaginal discharge; Ordered By:Skyler Ugalde; Discussion/Summary  Discussion Summary:   Her wet mount is again negative  I suspect this is normal physiologic discharge  At her request, vaginitis profile is sent for confirmation (she has no insurance and understands that this may cost around $200)  She will continue her probiotic  Chief Complaint  Chief Complaint Free Text Note Form: Pt is here c/o vaginal discharge and itching  History of Present Illness  HPI: 40year old white female here for evaluation  She notes recurrence in white vaginal discharge with intermittent mild itching  She does not think this feels like yeast or BV that she has had in the past  She denies odor  She is taking a probiotic dialy  She is using only Dove sensitive skin soap  Her  uses the same soap  They do not use lubricants, etc       Active Problems    1  Anxiety (300 00) (F41 9)   2  Anxiety disorder (300 00) (F41 9)   3  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)   4  Vaginal discharge (623 5) (N89 8)    Past Medical History    1  History of Chlamydia (079 98) (A74 9)   2  History of  3    Surgical History    1  History of Gallbladder Surgery   2  History of Tubal Ligation    Family History  Mother    1  Family history of migraine headaches (V17 2) (Z82 0)  Father    2  No pertinent family history    Social History    · Always uses seat belt   · Caffeine use (V49 89) (F15 90)   · Denied: History of Drug use   · Exercises regularly   · Feels safe at home   · Former smoker (Z90 64) (V81 669)   · Sexually active   · Three children   · Tubal ligations (V26 51) (Z98 51)    Current Meds   1  Metoprolol Tartrate TABS; Therapy: (Recorded:93Qnh1231) to Recorded    Allergies    1   No Known Drug Allergies    Vitals  Vital Signs    Recorded: 54WRD3613 23:25WI   Systolic 537, LUE, Sitting   Diastolic 74, LUE, Sitting   Weight 134 lb    BMI Calculated 22 65   BSA Calculated 1 66   LMP 38PJS2235     Physical Exam    Constitutional   General appearance: No acute distress, well appearing and well nourished  Genitourinary   External genitalia: Normal and no lesions appreciated  Vagina: Normal, no lesions or dryness appreciated  scant white discharge        Results/Data  [address] 16MZB2654 12:11PM Shashank Bud     Test Name Result Flag Reference   BACTERIA neg     CLUE CELL neg     TRICHOMONAS neg     YEAST WITH HYPHAE neg     YEAST neg     [place] UA 3 0     KOH PREP neg whiff         Signatures   Electronically signed by : Adarsh Garcia, [facility]; Nov 28 2017 12:12PM EST                       (Author)    Electronically signed by : CHARLES Rider ; Dec 29 2017 11:22AM EST                       (Author)

## 2018-01-17 NOTE — MISCELLANEOUS
Message   Recorded as Task   Date: 01/27/2017 12:35 PM, Created By: Jade Tavarez   Task Name: Result Follow Up   Assigned To: Jareth Buitrago   Regarding Patient: Kaur Raymundo, Status: In Progress   Festus Graft - 27 Jan 2017 12:35 PM     TASK CREATED  Labs indicate positive chlamydia and BV, in addition to yeast (which was diagnosed at office visit)  For chlamydia - please review natural hx of this and discuss transmision considerations  Advise azithro x single dose and advise that partner must also be treated  They should abstain from all sexual contact until 2 weeks after both have been treated  Please emphasize high rate of reinfection if not adhered to  For BV - advise Flagyl BID x7d and conservative vulvar hygiene  RTO 3 weeks after treatment for chlamydia CANDY  I will send in Rx's  Thanks! Shani Maya - 27 Jan 2017 2:13 PM     TASK IN PROGRESS   Shani Maya - 27 Jan 2017 2:32 PM     TASK EDITED  spoke with pt    results and all instructions given    [employer] notified        Active Problems    1  Anxiety disorder (300 00) (F41 9)   2  Bacterial vaginosis (616 10,041 9) (N76 0,B96 89)   3  Candidiasis (112 9) (B37 9)   4  Chest pressure (786 59) (R07 89)   5  Chlamydia (079 98) (A74 9)   6  Palpitations (785 1) (R00 2)   7  Personal history of exposure to potentially hazardous body fluids (V15 85) (Z77 21)    Current Meds   1  Azithromycin 500 MG Oral Tablet; TAKE 2 TABS (1GM) BY MOUTH NOW;   Therapy: 57YCX0261 to (Evaluate:28Jan2017)  Requested for: 83FDH8300; Last   IX:45AQJ4686 Ordered   2  Fluconazole 150 MG Oral Tablet; diflucan 150mg,,,take 1 today and repeat in 3 days; Therapy: 42DBU7882 to (Evaluate:28Jan2017)  Requested for: 02LUO8870; Last   Rx:24Jan2017 Ordered   3  MetroNIDAZOLE 500 MG Oral Tablet; Take 1 tablet twice daily; Therapy: 47ETY0838 to (Evaluate:98Nje2336)  Requested for: 82DHK9803; Last   Rx:27Jan2017 Ordered   4   Nystatin-Triamcinolone 142693-1 1 UNIT/GM-% External Cream; APPLY SPARINGLY TO   AFFECTED AREA(S) 3 TIMES A DAY; Therapy: 61WZJ4776 to (Evaluate:14Cfj7869)  Requested for: 21YRH6257; Last   Rx:24Jan2017 Ordered    Allergies    1   No Known Drug Allergies    Signatures   Electronically signed by : Humberto Patel, ; Jan 27 2017  2:32PM EST                       (Author)

## 2018-01-22 VITALS
DIASTOLIC BLOOD PRESSURE: 90 MMHG | BODY MASS INDEX: 22.82 KG/M2 | SYSTOLIC BLOOD PRESSURE: 140 MMHG | WEIGHT: 137 LBS | HEIGHT: 65 IN

## 2018-01-23 NOTE — MISCELLANEOUS
Message   Recorded as Task   Date: 12/27/2017 11:04 AM, Created By: Rachel Cook   Task Name: Medical Complaint Callback   Assigned To: Cipriano Hood   Regarding Patient: Zhao Gupta, Status: In Progress   Comment:    JuanjoCoraa - 27 Dec 2017 11:04 AM     TASK CREATED  Caller: Self; Medical Complaint; [phone] (Home)  pt called - has Swedish - started yesterday - but is Worse today - would like Rx called in for her  please advise  [phone]    [facility] [place]  Sandie Rutherford - 53 Dec 2017 1:01 PM     TASK IN PROGRESS   Sandie Rutherford - 27 Dec 2017 1:03 PM     TASK EDITED  lmtcb   Sandie Rutherford - 27 Dec 2017 1:17 PM     TASK EDITED  Pt got better ff clindamycin  Began yesterday with itching, white dsch  Rx diflucan to ehr        Active Problems    1  Anxiety (300 00) (F41 9)   2  Anxiety disorder (300 00) (F41 9)   3  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

## 2018-01-23 NOTE — MISCELLANEOUS
Message   Recorded as Task   Date: 11/30/2017 03:42 PM, Created By: Agustina Nieto   Task Name: Medical Complaint Callback   Assigned To: Neelam Valle   Regarding Patient: Prema Gonzalez, Status: In Progress   Salomayra Gift - 30 Nov 2017 3:42 PM     TASK CREATED  Caller: Self; Medical Complaint; [phone] (Home)  Pt saw [place] Heimlich on 11/28 and sx have gotten worse  Pt states that there is now an odor and discharge  Pt feels that she may have BV   Mikael Ip - 30 Nov 2017 3:49 PM     TASK IN PROGRESS   [place] Ip - 30 Nov 2017 3:56 PM     TASK EDITED  I know you are in [facility] tomorrow - pt aware also  She said she has same amt dsch but now has bad odor  She thinks she has bv  ( I know nothing seen under microscope)  Cultures not back and probably wont be back tomorrow  She had Tindazole (samples) from [facility] and said she had fewer side effects  I told her I thought that was expensive  Not sure if you want to rx her - or wait till Mon  Have a good day in [facility]   Melissa Ugalde - 01 Dec 2017 7:54 AM     TASK REPLIED TO: Previously Assigned To Melissa Ugalde  I want to wait until we have cultures back before we treat her   Marysol Armstrong - 04 Dec 2017 10:12 AM     TASK EDITED  Checked for culture results  No results as of yet  Mikael Ip - 39 Dec 2017 1:54 PM     TASK EDITED  Cultures look neg  Shall I tell her no rx? ? Thanks   Melissa Ugalde - 04 Dec 2017 2:09 PM     TASK REPLIED TO: Previously Assigned To Melissa Ugalde  I don't see any cultures   Mikael Ip - 04 Dec 2017 3:20 PM     TASK EDITED  No cultures there  I am reading "wet mount"  My error   Mikael Ip - 04 Dec 2017 3:29 PM     TASK EDITED  GC and chlamydia are neg - in filers room to be scanned in  Mikael Ip - 04 Dec 2017 3:29 PM     TASK EDITED  Await report on chart   [place] Ip - 04 Dec 2017 6:02 PM     TASK EDITED  Looks like pt has yeast and bv   Chlamydia was from last Oct   Juliethry Melissa Ugalde - 05 Dec 2017 9:12 AM     TASK REPLIED TO: Previously Assigned To Melissa Ugalde  I just sent a new task on this  She has bacteria, no yeast (screening was positive but confirmatory culture is neg for yeast)   Abigail Sanchez - 05 Dec 2017 9:57 AM     TASK EDITED  Duplicate task        Active Problems    1  Anxiety (300 00) (F41 9)   2  Anxiety disorder (300 00) (F41 9)   3  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

## 2018-01-23 NOTE — MISCELLANEOUS
Message   Recorded as Task   Date: 12/05/2017 08:52 AM, Created By: Idalia Rob   Task Name: Go to Result   Assigned To: Guera Carnes   Regarding Patient: Eugenia Rodriguez, Status: In Progress   Vladimir Leach - 05 Dec 2017 8:52 AM     TASK CREATED  Please let Dayton Case know that her cultures show bacteria  I would treat with Clindamycin cream QHS x 7 nights  Her yeast culture is neg  Abigail Sanchez - 05 Dec 2017 9:03 AM     TASK IN PROGRESS   Abigail Sanchez - 05 Dec 2017 9:07 AM     TASK EDITED  [facility] for pt to cb  RX sent to pharmacy  ext: [id number]   Abigail Sanchez - 05 Dec 2017 11:06 AM     TASK EDITED  Patient returned call - she is aware of culture results and that an rx was sent to her pharmacy  Gave directions on use  If SX do not improve 48-72 hours after last treatment to cb  Active Problems    1  Anxiety (300 00) (F41 9)   2  Anxiety disorder (300 00) (F41 9)   3  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

## 2018-09-14 DIAGNOSIS — B37.9 YEAST INFECTION: Primary | ICD-10-CM

## 2018-09-14 RX ORDER — FLUCONAZOLE 150 MG/1
TABLET ORAL
Qty: 2 TABLET | Refills: 0 | Status: SHIPPED | OUTPATIENT
Start: 2018-09-14 | End: 2018-09-17

## 2018-09-24 ENCOUNTER — TELEPHONE (OUTPATIENT)
Dept: OBGYN CLINIC | Facility: CLINIC | Age: 38
End: 2018-09-24

## 2018-09-24 DIAGNOSIS — N76.0 ACUTE VAGINITIS: Primary | ICD-10-CM

## 2018-09-24 RX ORDER — TINIDAZOLE 500 MG/1
2 TABLET ORAL
Qty: 8 TABLET | Refills: 0 | Status: SHIPPED | OUTPATIENT
Start: 2018-09-24 | End: 2018-09-26

## 2018-09-24 NOTE — TELEPHONE ENCOUNTER
Spoke with pt - symptoms started about 2 weeks ago  Thought it was a yeast infection - was prescribed Diflucan - helped a little  Got period - but was still had the irritation  After her period was over - she noticed she had an odor  She still has slight itching, burning when urine touches skin, thick sticky grey white discharge and fishy odor  No OTC product used  She also mentioned she can't use Flagyl - has bad side effects from it  Stated she was prescribed something before " that started with a T"  It was in a pill form  Make OV? Please advise  Thanks!

## 2018-09-24 NOTE — TELEPHONE ENCOUNTER
Patient called and would like something called in thinks she has BV   Bre Jasso had called in metogel or another medication that was a 2 or 3 dosage   Please call back

## 2019-08-02 ENCOUNTER — OFFICE VISIT (OUTPATIENT)
Dept: OBGYN CLINIC | Facility: CLINIC | Age: 39
End: 2019-08-02
Payer: COMMERCIAL

## 2019-08-02 VITALS
DIASTOLIC BLOOD PRESSURE: 86 MMHG | HEIGHT: 65 IN | SYSTOLIC BLOOD PRESSURE: 132 MMHG | BODY MASS INDEX: 23.72 KG/M2 | WEIGHT: 142.4 LBS

## 2019-08-02 DIAGNOSIS — B37.3 YEAST VAGINITIS: ICD-10-CM

## 2019-08-02 DIAGNOSIS — N76.0 BACTERIAL VAGINOSIS: ICD-10-CM

## 2019-08-02 DIAGNOSIS — Z11.3 SCREENING FOR STDS (SEXUALLY TRANSMITTED DISEASES): Primary | ICD-10-CM

## 2019-08-02 DIAGNOSIS — B96.89 BACTERIAL VAGINOSIS: ICD-10-CM

## 2019-08-02 DIAGNOSIS — Z01.419 ENCOUNTER FOR GYNECOLOGICAL EXAMINATION: ICD-10-CM

## 2019-08-02 PROBLEM — F41.9 ANXIETY: Status: ACTIVE | Noted: 2017-03-27

## 2019-08-02 PROCEDURE — 87591 N.GONORRHOEAE DNA AMP PROB: CPT | Performed by: PHYSICIAN ASSISTANT

## 2019-08-02 PROCEDURE — 87624 HPV HI-RISK TYP POOLED RSLT: CPT | Performed by: PHYSICIAN ASSISTANT

## 2019-08-02 PROCEDURE — 87491 CHLMYD TRACH DNA AMP PROBE: CPT | Performed by: PHYSICIAN ASSISTANT

## 2019-08-02 PROCEDURE — G0145 SCR C/V CYTO,THINLAYER,RESCR: HCPCS | Performed by: PHYSICIAN ASSISTANT

## 2019-08-02 PROCEDURE — 99213 OFFICE O/P EST LOW 20 MIN: CPT | Performed by: PHYSICIAN ASSISTANT

## 2019-08-02 RX ORDER — TINIDAZOLE 500 MG/1
TABLET ORAL
Qty: 10 TABLET | Refills: 0 | Status: SHIPPED | OUTPATIENT
Start: 2019-08-02 | End: 2019-08-07

## 2019-08-02 RX ORDER — FLUCONAZOLE 150 MG/1
TABLET ORAL
Qty: 2 TABLET | Refills: 0 | Status: SHIPPED | OUTPATIENT
Start: 2019-08-02 | End: 2019-08-05

## 2019-08-02 RX ORDER — DIPHENOXYLATE HYDROCHLORIDE AND ATROPINE SULFATE 2.5; .025 MG/1; MG/1
1 TABLET ORAL DAILY
COMMUNITY

## 2019-08-02 NOTE — PROGRESS NOTES
Edgard Fox  1980    S:  44 y o  female here for a problem visit  She has recurrent yeast and BV since being with her most recent partner of 2 years, every time occurs after intercourse  She now uses a boric acid suppository after intercourse which seems to keep things under control  For the past two days, however, she notices some erythema and itching at the introitus  She doesn't notice discharge because she has her menses  She uses Dove Sensitive skin soap and doesn't know what her partner uses  They do not use condoms because she had a tubal ligation, and she is uninterested in using condoms for the same reason  We discussed how semen changes the pH of the vagina and that consistent condom use would probably improve this  Past Medical History:   Diagnosis Date    Chlamydia infection     diagnosed with chlamydia in January 2017   treated and has tested negative since    Migraine      Family History   Problem Relation Age of Onset    Stroke Mother     Stroke Brother     Stroke Maternal Grandfather      Social History     Socioeconomic History    Marital status: /Civil Union     Spouse name: None    Number of children: None    Years of education: None    Highest education level: None   Occupational History    None   Social Needs    Financial resource strain: None    Food insecurity:     Worry: None     Inability: None    Transportation needs:     Medical: None     Non-medical: None   Tobacco Use    Smoking status: Never Smoker    Smokeless tobacco: Never Used   Substance and Sexual Activity    Alcohol use: Yes     Comment: social    Drug use: No    Sexual activity: None   Lifestyle    Physical activity:     Days per week: None     Minutes per session: None    Stress: None   Relationships    Social connections:     Talks on phone: None     Gets together: None     Attends Alevism service: None     Active member of club or organization: None     Attends meetings of clubs or organizations: None     Relationship status: None    Intimate partner violence:     Fear of current or ex partner: None     Emotionally abused: None     Physically abused: None     Forced sexual activity: None   Other Topics Concern    None   Social History Narrative    None       Review of Systems   Respiratory: Negative  Cardiovascular: Negative  Gastrointestinal: Negative for constipation and diarrhea  Genitourinary: Negative for difficulty urinating, pelvic pain, vaginal bleeding, vaginal discharge, itching or odor  O:  /86 (BP Location: Right arm, Patient Position: Sitting, Cuff Size: Standard)   Ht 5' 5" (1 651 m)   Wt 64 6 kg (142 lb 6 4 oz)   LMP 07/29/2019 (Exact Date)   BMI 23 70 kg/m²   She appears well and is in no distress  Abdomen is soft and nontender  External genitals show erythema and excoriation of the labia minora and the introitus  Vagina is normal, small amt menses present    Wet mount reveals few yeast, many clue cells, no trich, pH 5 5, pos whiff    A/P: Yeast vaginitis  Diflucan 150mg po x one dose, repeat in 3 days   BV  Tindamax 500mg two po daily x 5 days  Recommended consistent condom use to minimize infections - she is minimally interested  May continue boric acid PRN    Pap with HPV, GC/chlamydia collected (no Pap on file)   Return for yearly exam which is overdue

## 2019-08-05 ENCOUNTER — TELEPHONE (OUTPATIENT)
Dept: OBGYN CLINIC | Facility: CLINIC | Age: 39
End: 2019-08-05

## 2019-08-05 NOTE — TELEPHONE ENCOUNTER
Left Alethea President a message stating that if she picked up and began medication on Friday she should be taking her 2nd dose of Diflucan today and her Tidemax should be twice a day through Wednesday  If she symptoms are not better by Wednesday/Thursday please call us or if they are getting worse to call before  Pt advised if she has any additional questions or concerns to please call us back

## 2019-08-05 NOTE — TELEPHONE ENCOUNTER
Patient called said she was given antibiotics on Friday  Patient said she was told to call if antibiotics did not work  Patient would like to know if she should be seen again  Please advise

## 2019-08-06 LAB
C TRACH DNA SPEC QL NAA+PROBE: NEGATIVE
N GONORRHOEA DNA SPEC QL NAA+PROBE: NEGATIVE

## 2019-08-07 LAB
HPV HR 12 DNA CVX QL NAA+PROBE: NEGATIVE
HPV16 DNA CVX QL NAA+PROBE: NEGATIVE
HPV18 DNA CVX QL NAA+PROBE: NEGATIVE

## 2019-08-09 LAB
LAB AP GYN PRIMARY INTERPRETATION: NORMAL
Lab: NORMAL
PATH INTERP SPEC-IMP: NORMAL

## 2019-09-05 ENCOUNTER — TELEPHONE (OUTPATIENT)
Dept: OBGYN CLINIC | Facility: CLINIC | Age: 39
End: 2019-09-05

## 2019-09-05 DIAGNOSIS — B96.89 BV (BACTERIAL VAGINOSIS): Primary | ICD-10-CM

## 2019-09-05 DIAGNOSIS — N76.0 BV (BACTERIAL VAGINOSIS): Primary | ICD-10-CM

## 2019-09-05 NOTE — TELEPHONE ENCOUNTER
One mo ago, pt had diflucan and tindamax  Has had both rxs also in past also    Pt said she did get better 1 mo ago  Yesterday began with odor again  Much worse this AM  No dsch , no itch  I called in tind[facility]ax 500, #10, [facility]  Told pt, if no better ff rx - ov

## 2019-09-06 ENCOUNTER — TELEPHONE (OUTPATIENT)
Dept: OBGYN CLINIC | Facility: CLINIC | Age: 39
End: 2019-09-06

## 2019-09-06 DIAGNOSIS — B37.9 CANDIDIASIS: Primary | ICD-10-CM

## 2019-09-06 RX ORDER — FLUCONAZOLE 150 MG/1
150 TABLET ORAL ONCE
Qty: 1 TABLET | Refills: 0 | Status: SHIPPED | OUTPATIENT
Start: 2019-09-06 | End: 2019-09-06

## 2019-09-06 NOTE — TELEPHONE ENCOUNTER
Patient called yesterday because she thinks she has BV but she was only given antibiotics an the patient wants diflucan too  Patient said about a month ago she was treated for BV and was given both medications and wants both medications this time as well  Please advise

## 2019-09-06 NOTE — TELEPHONE ENCOUNTER
Pt was rx'd tindamax yesterday for 2nd bv infection  since 08/02  She was also given diflucan that time     Notices white  thick disch this am  req rx for diflucan  Pt has had reactions to the otc creams, and is anxious with the weekend coming  [facility]

## 2019-09-09 RX ORDER — TINIDAZOLE 500 MG/1
TABLET ORAL
Qty: 10 TABLET | Refills: 0 | Status: SHIPPED | OUTPATIENT
Start: 2019-09-09 | End: 2019-09-14

## 2019-09-13 DIAGNOSIS — N76.0 BACTERIAL VAGINOSIS: Primary | ICD-10-CM

## 2019-09-13 DIAGNOSIS — B96.89 BACTERIAL VAGINOSIS: Primary | ICD-10-CM

## 2019-09-13 NOTE — TELEPHONE ENCOUNTER
tindamax is not on formulary can she get another rx, I see metronidazole  Is on her formulary will that work?

## 2019-09-16 RX ORDER — METRONIDAZOLE 500 MG/1
TABLET ORAL
Qty: 14 TABLET | Refills: 0 | Status: SHIPPED | OUTPATIENT
Start: 2019-09-16 | End: 2019-09-24

## 2020-01-15 ENCOUNTER — TELEPHONE (OUTPATIENT)
Dept: DERMATOLOGY | Facility: CLINIC | Age: 40
End: 2020-01-15

## 2020-05-30 ENCOUNTER — TELEPHONE (OUTPATIENT)
Dept: OTHER | Facility: OTHER | Age: 40
End: 2020-05-30

## 2020-05-30 ENCOUNTER — DOCUMENTATION (OUTPATIENT)
Dept: LABOR AND DELIVERY | Facility: HOSPITAL | Age: 40
End: 2020-05-30

## 2020-05-30 DIAGNOSIS — B37.9 YEAST INFECTION: Primary | ICD-10-CM

## 2020-05-30 RX ORDER — FLUCONAZOLE 150 MG/1
150 TABLET ORAL ONCE
Qty: 1 TABLET | Refills: 0 | Status: CANCELLED | OUTPATIENT
Start: 2020-05-30 | End: 2020-05-30

## 2020-05-30 RX ORDER — FLUCONAZOLE 150 MG/1
150 TABLET ORAL ONCE
Qty: 2 TABLET | Refills: 0 | Status: SHIPPED | OUTPATIENT
Start: 2020-05-30 | End: 2020-05-30

## 2020-05-30 RX ORDER — FLUCONAZOLE 150 MG/1
150 TABLET ORAL ONCE
COMMUNITY
End: 2020-10-15 | Stop reason: ALTCHOICE

## 2020-06-01 ENCOUNTER — TELEPHONE (OUTPATIENT)
Dept: OBGYN CLINIC | Facility: CLINIC | Age: 40
End: 2020-06-01

## 2020-08-08 ENCOUNTER — TELEPHONE (OUTPATIENT)
Dept: OTHER | Facility: OTHER | Age: 40
End: 2020-08-08

## 2020-08-08 ENCOUNTER — DOCUMENTATION (OUTPATIENT)
Dept: LABOR AND DELIVERY | Facility: HOSPITAL | Age: 40
End: 2020-08-08

## 2020-08-08 NOTE — TELEPHONE ENCOUNTER
Paged via TC:"[facility] / Patient Kapil Boo 1980 / Mikhail Abreu # [phone] / Brianna Mauro patient / Patient is experiencing abnormal vaginal bleeding "

## 2020-08-09 NOTE — PROGRESS NOTES
Patient called answering service regarding heavy vaginal bleeding  No further detail given  Called back but went to voicemail  Precautions left on voicemail

## 2020-08-10 ENCOUNTER — TELEPHONE (OUTPATIENT)
Dept: OBGYN CLINIC | Facility: CLINIC | Age: 40
End: 2020-08-10

## 2020-08-10 NOTE — PROGRESS NOTES
Pt never got to speak with EC  Wants to speak to someone  Pt has had monthly menses - never irreg - till few weeks ago  Had normal menses   Then stopped for 1 week - then menses began again  Pt bleeding like regular period again  Not going thru 1 pad/hr  - will call if heavy bleeding  Has a doc appt in 2 weeks   Will use advil and cb if bleeding increases

## 2020-10-15 ENCOUNTER — ANNUAL EXAM (OUTPATIENT)
Dept: OBGYN CLINIC | Facility: CLINIC | Age: 40
End: 2020-10-15
Payer: COMMERCIAL

## 2020-10-15 VITALS
TEMPERATURE: 98.5 F | BODY MASS INDEX: 21.76 KG/M2 | DIASTOLIC BLOOD PRESSURE: 64 MMHG | WEIGHT: 130.6 LBS | HEIGHT: 65 IN | SYSTOLIC BLOOD PRESSURE: 122 MMHG

## 2020-10-15 DIAGNOSIS — N92.6 IRREGULAR MENSES: ICD-10-CM

## 2020-10-15 DIAGNOSIS — Z01.419 ENCOUNTER FOR GYNECOLOGICAL EXAMINATION (GENERAL) (ROUTINE) WITHOUT ABNORMAL FINDINGS: Primary | ICD-10-CM

## 2020-10-15 DIAGNOSIS — Z12.31 ENCOUNTER FOR SCREENING MAMMOGRAM FOR MALIGNANT NEOPLASM OF BREAST: ICD-10-CM

## 2020-10-15 PROCEDURE — S0612 ANNUAL GYNECOLOGICAL EXAMINA: HCPCS | Performed by: NURSE PRACTITIONER

## 2020-10-15 RX ORDER — BUPROPION HYDROCHLORIDE 150 MG/1
150 TABLET ORAL EVERY MORNING
COMMUNITY
Start: 2020-09-29

## 2022-04-18 ENCOUNTER — TELEPHONE (OUTPATIENT)
Dept: OBGYN CLINIC | Facility: CLINIC | Age: 42
End: 2022-04-18

## 2022-04-18 DIAGNOSIS — B37.3 YEAST VAGINITIS: Primary | ICD-10-CM

## 2022-04-18 RX ORDER — FLUCONAZOLE 150 MG/1
TABLET ORAL
Qty: 2 TABLET | Refills: 0 | Status: SHIPPED | OUTPATIENT
Start: 2022-04-18 | End: 2022-04-22

## 2022-04-18 NOTE — TELEPHONE ENCOUNTER
Pt having vaginal internal and external itching, along with some clumpy discharge, is asking for diflucan   Yearly apt made with you can you fill

## 2022-08-15 ENCOUNTER — HOSPITAL ENCOUNTER (EMERGENCY)
Facility: HOSPITAL | Age: 42
Discharge: HOME | End: 2022-08-15
Attending: EMERGENCY MEDICINE
Payer: MEDICARE

## 2022-08-15 VITALS
TEMPERATURE: 97.6 F | OXYGEN SATURATION: 99 % | BODY MASS INDEX: 19.66 KG/M2 | SYSTOLIC BLOOD PRESSURE: 134 MMHG | WEIGHT: 118 LBS | HEIGHT: 65 IN | RESPIRATION RATE: 18 BRPM | DIASTOLIC BLOOD PRESSURE: 63 MMHG | HEART RATE: 88 BPM

## 2022-08-15 DIAGNOSIS — R07.9 CHEST PAIN, UNSPECIFIED TYPE: Primary | ICD-10-CM

## 2022-08-15 LAB
ALBUMIN SERPL BCP-MCNC: 4.2 G/DL (ref 3.5–5)
ALP SERPL-CCNC: 45 U/L (ref 34–104)
ALT SERPL W P-5'-P-CCNC: 16 U/L (ref 7–52)
ANION GAP SERPL CALCULATED.3IONS-SCNC: 7 MMOL/L (ref 4–13)
AST SERPL W P-5'-P-CCNC: 16 U/L (ref 13–39)
BASOPHILS # BLD AUTO: 0.04 THOUSANDS/ΜL (ref 0–0.1)
BASOPHILS NFR BLD AUTO: 1 % (ref 0–1)
BILIRUB SERPL-MCNC: 0.37 MG/DL (ref 0.2–1)
BUN SERPL-MCNC: 18 MG/DL (ref 5–25)
CALCIUM SERPL-MCNC: 9 MG/DL (ref 8.4–10.2)
CARDIAC TROPONIN I PNL SERPL HS: <2 NG/L
CARDIAC TROPONIN I PNL SERPL HS: <2 NG/L
CHLORIDE SERPL-SCNC: 102 MMOL/L (ref 96–108)
CO2 SERPL-SCNC: 28 MMOL/L (ref 21–32)
CREAT SERPL-MCNC: 0.78 MG/DL (ref 0.6–1.3)
EOSINOPHIL # BLD AUTO: 0.22 THOUSAND/ΜL (ref 0–0.61)
EOSINOPHIL NFR BLD AUTO: 3 % (ref 0–6)
ERYTHROCYTE [DISTWIDTH] IN BLOOD BY AUTOMATED COUNT: 12.1 % (ref 11.6–15.1)
GFR SERPL CREATININE-BSD FRML MDRD: 94 ML/MIN/1.73SQ M
GLUCOSE SERPL-MCNC: 66 MG/DL (ref 65–140)
HCT VFR BLD AUTO: 42 % (ref 34.8–46.1)
HGB BLD-MCNC: 14.6 G/DL (ref 11.5–15.4)
IMM GRANULOCYTES # BLD AUTO: 0.03 THOUSAND/UL (ref 0–0.2)
IMM GRANULOCYTES NFR BLD AUTO: 0 % (ref 0–2)
LYMPHOCYTES # BLD AUTO: 1.45 THOUSANDS/ΜL (ref 0.6–4.47)
LYMPHOCYTES NFR BLD AUTO: 21 % (ref 14–44)
MCH RBC QN AUTO: 32.9 PG (ref 26.8–34.3)
MCHC RBC AUTO-ENTMCNC: 34.8 G/DL (ref 31.4–37.4)
MCV RBC AUTO: 95 FL (ref 82–98)
MONOCYTES # BLD AUTO: 0.84 THOUSAND/ΜL (ref 0.17–1.22)
MONOCYTES NFR BLD AUTO: 12 % (ref 4–12)
NEUTROPHILS # BLD AUTO: 4.23 THOUSANDS/ΜL (ref 1.85–7.62)
NEUTS SEG NFR BLD AUTO: 63 % (ref 43–75)
NRBC BLD AUTO-RTO: 0 /100 WBCS
PLATELET # BLD AUTO: 234 THOUSANDS/UL (ref 149–390)
PMV BLD AUTO: 10.1 FL (ref 8.9–12.7)
POTASSIUM SERPL-SCNC: 3.6 MMOL/L (ref 3.5–5.3)
PROT SERPL-MCNC: 7.1 G/DL (ref 6.4–8.4)
RBC # BLD AUTO: 4.44 MILLION/UL (ref 3.81–5.12)
SODIUM SERPL-SCNC: 137 MMOL/L (ref 135–147)
WBC # BLD AUTO: 6.81 THOUSAND/UL (ref 4.31–10.16)

## 2022-08-15 PROCEDURE — 99285 EMERGENCY DEPT VISIT HI MDM: CPT

## 2022-08-15 PROCEDURE — 84484 ASSAY OF TROPONIN QUANT: CPT | Performed by: EMERGENCY MEDICINE

## 2022-08-15 PROCEDURE — 99285 EMERGENCY DEPT VISIT HI MDM: CPT | Performed by: EMERGENCY MEDICINE

## 2022-08-15 PROCEDURE — 36415 COLL VENOUS BLD VENIPUNCTURE: CPT

## 2022-08-15 PROCEDURE — 93005 ELECTROCARDIOGRAM TRACING: CPT

## 2022-08-15 PROCEDURE — 85025 COMPLETE CBC W/AUTO DIFF WBC: CPT | Performed by: EMERGENCY MEDICINE

## 2022-08-15 PROCEDURE — 80053 COMPREHEN METABOLIC PANEL: CPT | Performed by: EMERGENCY MEDICINE

## 2022-08-15 NOTE — ED PROVIDER NOTES
History  Chief Complaint   Patient presents with    Chest Pain     Pt complains of CP started last night woke pt up this morning     41-year-old female presents emergency department with chief complaint of chest pain  Patient states that she initially began having the pain yesterday around dinnertime, nonradiating, 6/10 in nature, crampy/burning in nature that improved spontaneously  Patient notes that she also developed a similar episode 3:00 a m  This morning that woke her from sleep with associated diaphoresis  Patient states that the pain improved again and then returned a few hours later prompting her to come to the emergency department  Patient notes that she recently lost her son to an overdose 1 week ago, and is unsure if her symptoms are due to anxiety at this time  Patient denies any headache, shortness of breath, nausea, vomiting, diarrhea, abdominal pain, or any other concerns at this time  History provided by:  Patient   used: No    Chest Pain  Pain location:  L chest  Pain quality: burning    Pain quality comment:  Crampy  Pain radiates to:  Does not radiate  Pain radiates to the back: no    Pain severity:  Mild  Onset quality:  Sudden  Timing:  Intermittent  Progression:  Waxing and waning  Chronicity:  New  Context: not breathing    Relieved by:  Nothing  Worsened by:  Nothing tried  Ineffective treatments:  None tried  Associated symptoms: no abdominal pain, no back pain, no cough, no fever, no palpitations, no shortness of breath and not vomiting        Prior to Admission Medications   Prescriptions Last Dose Informant Patient Reported? Taking?    buPROPion (WELLBUTRIN XL) 150 mg 24 hr tablet  Self Yes No   Sig: Take 150 mg by mouth every morning   multivitamin (THERAGRAN) TABS  Self Yes No   Sig: Take 1 tablet by mouth daily      Facility-Administered Medications: None       Past Medical History:   Diagnosis Date    Chlamydia infection     diagnosed with chlamydia in January 2017  treated and has tested negative since    Depression     Migraine        Past Surgical History:   Procedure Laterality Date    CHOLECYSTECTOMY         Family History   Problem Relation Age of Onset    Stroke Mother     Stroke Brother     Stroke Maternal Grandfather      I have reviewed and agree with the history as documented  E-Cigarette/Vaping     E-Cigarette/Vaping Substances     Social History     Tobacco Use    Smoking status: Never Smoker    Smokeless tobacco: Never Used   Substance Use Topics    Alcohol use: Never    Drug use: No        Review of Systems   Constitutional: Negative for chills and fever  HENT: Negative for ear pain and sore throat  Eyes: Negative for pain and visual disturbance  Respiratory: Negative for cough and shortness of breath  Cardiovascular: Positive for chest pain  Negative for palpitations  Gastrointestinal: Negative for abdominal pain and vomiting  Genitourinary: Negative for dysuria and hematuria  Musculoskeletal: Negative for arthralgias and back pain  Skin: Negative for color change and rash  Neurological: Negative for seizures and syncope  All other systems reviewed and are negative  Physical Exam  ED Triage Vitals [08/15/22 0748]   Temperature Pulse Respirations Blood Pressure SpO2   97 6 °F (36 4 °C) 88 18 134/63 99 %      Temp Source Heart Rate Source Patient Position - Orthostatic VS BP Location FiO2 (%)   Oral Monitor -- Left arm --      Pain Score       --             Orthostatic Vital Signs  Vitals:    08/15/22 0748   BP: 134/63   Pulse: 88       Physical Exam  Vitals and nursing note reviewed  Constitutional:       General: She is not in acute distress  Appearance: She is well-developed  HENT:      Head: Normocephalic and atraumatic  Eyes:      Extraocular Movements: Extraocular movements intact        Conjunctiva/sclera: Conjunctivae normal    Cardiovascular:      Rate and Rhythm: Normal rate and regular rhythm  Heart sounds: Normal heart sounds  No murmur heard  Pulmonary:      Effort: Pulmonary effort is normal  No respiratory distress  Breath sounds: Normal breath sounds  Abdominal:      Palpations: Abdomen is soft  Tenderness: There is no abdominal tenderness  Musculoskeletal:      Cervical back: Normal range of motion  Skin:     General: Skin is warm and dry  Capillary Refill: Capillary refill takes less than 2 seconds  Neurological:      Mental Status: She is alert     Psychiatric:         Mood and Affect: Mood normal          Behavior: Behavior normal          ED Medications  Medications - No data to display    Diagnostic Studies  Results Reviewed     Procedure Component Value Units Date/Time    HS Troponin I 2hr [[id number]] Collected: 08/15/22 1003    Lab Status: Final result Specimen: Blood from Arm, Right Updated: 08/15/22 1036     hs TnI 2hr <2 ng/L      Delta 2hr hsTnI --    HS Troponin 0hr (reflex protocol) [[id number]]  (Normal) Collected: 08/15/22 0811    Lab Status: Final result Specimen: Blood from Arm, Right Updated: 08/15/22 0948     hs TnI 0hr <2 ng/L     Comprehensive metabolic panel [[id number]] Collected: 08/15/22 0811    Lab Status: Final result Specimen: Blood from Arm, Right Updated: 08/15/22 0849     Sodium 137 mmol/L      Potassium 3 6 mmol/L      Chloride 102 mmol/L      CO2 28 mmol/L      ANION GAP 7 mmol/L      BUN 18 mg/dL      Creatinine 0 78 mg/dL      Glucose 66 mg/dL      Calcium 9 0 mg/dL      AST 16 U/L      ALT 16 U/L      Alkaline Phosphatase 45 U/L      Total Protein 7 1 g/dL      Albumin 4 2 g/dL      Total Bilirubin 0 37 mg/dL      eGFR 94 ml/min/1 73sq m     Narrative:      Jerzy guidelines for Chronic Kidney Disease (CKD):     Stage 1 with normal or high GFR (GFR > 90 mL/min/1 73 square meters)    Stage 2 Mild CKD (GFR = 60-89 mL/min/1 73 square meters)    Stage 3A Moderate CKD (GFR = 45-59 mL/min/1 73 square meters)    Stage 3B Moderate CKD (GFR = 30-44 mL/min/1 73 square meters)    Stage 4 Severe CKD (GFR = 15-29 mL/min/1 73 square meters)    Stage 5 End Stage CKD (GFR <15 mL/min/1 73 square meters)  Note: GFR calculation is accurate only with a steady state creatinine    CBC and differential [[id number]] Collected: 08/15/22 0811    Lab Status: Final result Specimen: Blood from Arm, Right Updated: 08/15/22 0836     WBC 6 81 Thousand/uL      RBC 4 44 Million/uL      Hemoglobin 14 6 g/dL      Hematocrit 42 0 %      MCV 95 fL      MCH 32 9 pg      MCHC 34 8 g/dL      RDW 12 1 %      MPV 10 1 fL      Platelets 183 Thousands/uL      nRBC 0 /100 WBCs      Neutrophils Relative 63 %      Immat GRANS % 0 %      Lymphocytes Relative 21 %      Monocytes Relative 12 %      Eosinophils Relative 3 %      Basophils Relative 1 %      Neutrophils Absolute 4 23 Thousands/µL      Immature Grans Absolute 0 03 Thousand/uL      Lymphocytes Absolute 1 45 Thousands/µL      Monocytes Absolute 0 84 Thousand/µL      Eosinophils Absolute 0 22 Thousand/µL      Basophils Absolute 0 04 Thousands/µL                  No orders to display         Procedures  ECG 12 Lead Documentation Only    Date/Time: 8/15/2022 10:30 AM  Performed by: Lizet Hull MD  Authorized by: Lizet Hull MD     Indications / Diagnosis:  Chest pain  ECG reviewed by me, the ED Provider: yes    Patient location:  ED  Previous ECG:     Previous ECG:  Compared to current    Comparison ECG info:  No acute PAC seen  Interpretation:     Interpretation: normal    Quality:     Tracing quality:  Limited by artifact  Rate:     ECG rate:  65    ECG rate assessment: normal    Rhythm:     Rhythm: sinus rhythm    Ectopy:     Ectopy: none    QRS:     QRS axis:  Normal  Conduction:     Conduction: normal    ST segments:     ST segments:  Normal  T waves:     T waves: normal    Comments:      Normal sinus rhythm at 65 beats per minute, no PAC, no PVC, no acute ischemic changes  Limited by artifact  ECG 12 Lead Documentation Only    Date/Time: 8/15/2022 10:32 AM  Performed by: Violetta Morocho MD  Authorized by: Violetta Morocho MD     Indications / Diagnosis:  Chest pain  ECG reviewed by me, the ED Provider: yes    Patient location:  ED  Previous ECG:     Previous ECG:  Compared to current    Similarity:  No change  Interpretation:     Interpretation: normal    Quality:     Tracing quality:  Limited by artifact  Rate:     ECG rate:  65    ECG rate assessment: normal    Rhythm:     Rhythm: sinus rhythm    Ectopy:     Ectopy: none    QRS:     QRS axis:  Normal  Conduction:     Conduction: normal    ST segments:     ST segments:  Normal  T waves:     T waves: normal    Comments:      Normal sinus rhythm at 65 beats per minute, no PAC, no PVC, no acute ischemic changes, unchanged from previous EKG  Limited due to artifact  ED Course             HEART Risk Score    Flowsheet Row Most Recent Value   Heart Score Risk Calculator    History 0 Filed at: 08/15/2022 1028   ECG 0 Filed at: 08/15/2022 1028   Age 0 Filed at: 08/15/2022 1028   Risk Factors 0 Filed at: 08/15/2022 1028   Troponin 0 Filed at: 08/15/2022 1028   HEART Score 0 Filed at: 08/15/2022 1028                                MDM  Number of Diagnoses or Management Options  Chest pain, unspecified type  Diagnosis management comments: 80-year-old female with no significant past medical history presents emergency department complaining of chest pain  Patient recently lost her son when week ago to an overdose, and is unsure of her symptoms are due to her anxiety or other pathology  Patient is seen and examined at the with benign exam, regular rate and rhythm, no murmurs, no friction rub, no chest wall tenderness  CBC, CMP, EKG, troponin ordered  No acute abnormality noted on laboratory findings  EKG reported no acute ischemic changes or other concerning findings    Patient appears well, is nontoxic appearing, expresses understanding and agrees with plan of care at this time  In light of this patient would benefit from outpatient management  Amount and/or Complexity of Data Reviewed  Clinical lab tests: ordered and reviewed  Tests in the medicine section of CPT®: ordered and reviewed    Patient Progress  Patient progress: stable      Disposition  Final diagnoses:   Chest pain, unspecified type     Time reflects when diagnosis was documented in both MDM as applicable and the Disposition within this note     Time User Action Codes Description Comment    8/15/2022 10:42 AM de Juan Pablo White Add [R07 9] Chest pain, unspecified type       ED Disposition     ED Disposition   Discharge    Condition   Stable    Date/Time   Mon Aug 15, 2022 10:42 AM    Comment   Kavon Loli discharge to home/self care  Follow-up Information     Follow up With Specialties Details Why Contact Info    Tamara Martinez MD Family Medicine   [address] [place] [id number] [phone]            Discharge Medication List as of 8/15/2022 10:45 AM      CONTINUE these medications which have NOT CHANGED    Details   buPROPion (WELLBUTRIN XL) 150 mg 24 hr tablet Take 150 mg by mouth every morning, Starting Tue 9/29/2020, Historical Med      multivitamin (THERAGRAN) TABS Take 1 tablet by mouth daily, Historical Med           No discharge procedures on file  PDMP Review     None           ED Provider  Attending physically available and evaluated Lisavasiliy Loli  I managed the patient along with the ED Attending      Electronically Signed by         Catalina Bumpers, MD  08/15/22 9237

## 2022-08-15 NOTE — Clinical Note
Jaskaran Rico was seen and treated in our emergency department on 8/15/2022  No restrictions            Diagnosis:     Stella Moctezuma  may return to work on return date  She may return on this date: 08/17/2022         If you have any questions or concerns, please don't hesitate to call        eJnsen Oglesby MD    ______________________________           _______________          _______________  Hospital Representative                              Date                                Time

## 2022-08-15 NOTE — ED ATTENDING ATTESTATION
8/15/2022  ILewis MD, saw and evaluated the patient  I have discussed the patient with the resident/non-physician practitioner and agree with the resident's/non-physician practitioner's findings, Plan of Care, and MDM as documented in the resident's/non-physician practitioner's note, except where noted  All available labs and Radiology studies were reviewed  I was present for key portions of any procedure(s) performed by the resident/non-physician practitioner and I was immediately available to provide assistance  At this point I agree with the current assessment done in the Emergency Department  I have conducted an independent evaluation of this patient a history and physical is as follows:    69-year-old female, presenting with chest pain  Reports developing pain yesterday evening  On exam, patient comfortable in no distress, normal respiratory effort with clear lungs  ED Course     EKGs and labs reviewed with no acute abnormalities  Patient with low heart score, low risk of major adverse cardiac event in the next 6 weeks  Patient instructed to follow-up with doctor, return precautions given      Critical Care Time  Procedures

## 2022-08-16 LAB
ATRIAL RATE: 153 BPM
ATRIAL RATE: 416 BPM
P AXIS: 74 DEGREES
QRS AXIS: 56 DEGREES
QRS AXIS: 63 DEGREES
QRSD INTERVAL: 100 MS
QRSD INTERVAL: 88 MS
QT INTERVAL: 382 MS
QT INTERVAL: 396 MS
QTC INTERVAL: 411 MS
QTC INTERVAL: 412 MS
T WAVE AXIS: 64 DEGREES
T WAVE AXIS: 66 DEGREES
VENTRICULAR RATE: 65 BPM
VENTRICULAR RATE: 70 BPM

## 2022-08-16 PROCEDURE — 93010 ELECTROCARDIOGRAM REPORT: CPT | Performed by: INTERNAL MEDICINE

## 2022-08-22 ENCOUNTER — TELEPHONE (OUTPATIENT)
Dept: OBGYN CLINIC | Facility: CLINIC | Age: 42
End: 2022-08-22

## 2022-08-22 DIAGNOSIS — N76.0 BV (BACTERIAL VAGINOSIS): Primary | ICD-10-CM

## 2022-08-22 DIAGNOSIS — B96.89 BV (BACTERIAL VAGINOSIS): Primary | ICD-10-CM

## 2022-08-22 NOTE — TELEPHONE ENCOUNTER
Pt has itch & odor,  NO discharge,  Pt does NOT  think its a Yoruba but is BV,  pls call pt to discuss,   Thanks

## 2022-08-22 NOTE — TELEPHONE ENCOUNTER
Pt had diflucan on Sat - made no difference  Pt said she is sure she has bv  Has had both in past  Was a pt of [facility]  May I call in metrogel 0 75   Sig hs in vagina for 5 nights for her?   Thanks

## 2022-08-23 RX ORDER — METRONIDAZOLE 7.5 MG/G
GEL VAGINAL
Qty: 70 G | Refills: 0 | Status: SHIPPED | OUTPATIENT
Start: 2022-08-23 | End: 2022-10-04

## 2022-10-03 PROBLEM — L30.9 HAND DERMATITIS: Status: ACTIVE | Noted: 2021-09-23

## 2022-10-03 NOTE — PROGRESS NOTES
Assessment/Plan:  Problem List Items Addressed This Visit        Cardiovascular and Mediastinum    Migraine     Stable on Maxalt 10 mg p r n       For headache and migraine prevention I would suggest a combination vitamin supplement which may include 1 or more of the following ingredients:  Magnesium 400 mg , Riboflavin (vitamin B2) 400 - 600 mg,  Butterbur 150 mg (PA free)  Other ingredients that may be helpful are feverfew, coenzyme Q10 100 mg three times a day,  melatonin and kiley  Some example brands that combine some of these ingredients are Migravent or Dolovent  Relevant Medications    rizatriptan (MAXALT) 10 mg tablet    buPROPion (WELLBUTRIN XL) 300 mg 24 hr tablet    Other Relevant Orders    Magnesium    RESOLVED: Essential hypertension    Relevant Orders    CBC and differential       Musculoskeletal and Integument    Hand dermatitis     Stable s meds  Other    Anxiety     Uncontrolled  Pending partial inpatient Psych admission  Will likely need D/C Wellbutrin XL 300mg QD  Smart phone eliz list and counseling list provided today  Relevant Orders    Ambulatory Referral to [facility] or [facility] Program    CBC and differential    Vitamin D deficiency     Pending labs  Relevant Orders    Comprehensive metabolic panel    Vitamin D 25 hydroxy    Depression     Uncontrolled  Pending partial inpatient Psych admission  Will likely need D/C Wellbutrin XL 300mg QD  Smart phone eliz list and counseling list provided today  Relevant Medications    buPROPion (WELLBUTRIN XL) 300 mg 24 hr tablet    Other Relevant Orders    Ambulatory Referral to [facility] or [facility] Program    CBC and differential    Comprehensive metabolic panel    TSH, 3rd generation with Free T4 reflex    Electronic cigarette use     Contemplative  Electronic cigarette smoking cessation advised  History of alcohol abuse     In remission  Continue AA           Relevant Orders    Ambulatory Referral to [facility] or [facility] Psych Program      Other Visit Diagnoses     Annual physical exam    -  Primary    Encounter for screening mammogram for breast cancer        Relevant Orders    Mammo screening bilateral w 3d & cad    Screening for diabetes mellitus        Relevant Orders    Hemoglobin A1C    Screening for cardiovascular condition        Relevant Orders    CBC and differential    Comprehensive metabolic panel    Lipid panel    LDL cholesterol, direct    Screening for HIV (human immunodeficiency virus)        Relevant Orders    HIV 1/2 Antigen/Antibody (4th Generation) w Reflex [facility]    Need for hepatitis C screening test        Relevant Orders    Hepatitis C antibody    Decreased appetite        Relevant Orders    Ambulatory Referral to [facility] or [facility] Program    TSH, 3rd generation with Free T4 reflex    Magnesium    Poor motivation        Relevant Orders    Ambulatory Referral to [facility] or [facility] Program    TSH, 3rd generation with Free T4 reflex           Return in about 6 weeks (around 11/15/2022) for 40min  F/U Anx/Dep, Migraine, Vit D Def, ECig, Labs; PRN Partial Psych D/C   Future Appointments   Date Time Provider Govind Tavera   11/16/2022 10:00 AM Phyllis Sethi DO [facility]   12/5/2022  3:20 PM Sakina Fragoso MD [facility]        Subjective:     So Vargas is a 43 y o  female who presents today as a new patient for her medical conditions  New Patient    Previous PCP:  Dr Hanna Bennett at [address] [place] [address]  Reason for Transfer:  Raul Daniels are patients here, Access  Last seen by previous PCP:  9/23/21  Last Labs:  8/15/22  Last Physical:  11/5/20  Medical Records Requested:  No      HPI:  Chief Complaint   Patient presents with    Anxiety     Dx with anxiety in the past  Pt's son passed away three months ago  Current sx are panic attacks, not eating enough, losing weight       Nevus Mole on the mid back  pt feels as though the mole has gotten darker and is "uneven looking " Would like it to be checked   HM     Last Tdap she believes was in the last few years  Pt has two covid doses, has not gotten any boosters  Unknown if she was screened for Hep C previously  Pt has not had a mammo yet  Needs order for mammo  -- Above per clinical staff and reviewed  --      HPI      Today:      Nevi on Back - Symptoms x years, color is changing in the past year  Irritated under bra strap  Last saw Derm  - unsure of who  HTN - Improved s/p ETOH cessation   Previously on Metoprolol  BP check outside of office "stable" per patient  Not watching diet  Only drinking 4-6 Ensure protein drinks daily due to lack of appetite since  when son Reyes Brand entered rehab, then  from drug overdose  +Exercise - Walking for 1 hour, 3 times per week  Depression / Anxiety - Mood worse since  when son Reyes Brand entered rehab, then  from drug overdose   Decreased appetite and motivation  On Wellbutrin XL 300mg QD  Poor social supports  No SI/HI/AH/VH  Feels safe at home  Unable to find counselor that accepts her insurance  Was able to get on waiting lists  Last counseling   Last saw Psychiatrist in early s for anxiety  Previously on Prozac, Zoloft, Paxil - unsure of why Rx was D/C  No higher dose of Wellbutrin previously  She called suicide hotline a few weeks ago to ask how should she know if she was suicidal   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 and feels she worries too much about the children's safety at work          PHQ-2/9 Depression Screening    Little interest or pleasure in doing things: 3 - nearly every day  Feeling down, depressed, or hopeless: 3 - nearly every day  Trouble falling or staying asleep, or sleeping too much: 0 - not at all  Feeling tired or having little energy: 2 - more than half the days  Poor appetite or overeating: 3 - nearly every day  Feeling bad about yourself - or that you are a failure or have let yourself or your family down: 1 - several days  Trouble concentrating on things, such as reading the newspaper or watching television: 3 - nearly every day  Moving or speaking so slowly that other people could have noticed  Or the opposite - being so fidgety or restless that you have been moving around a lot more than usual: 2 - more than half the days  Thoughts that you would be better off dead, or of hurting yourself in some way: 1 - several days  PHQ-2 Score: 6  PHQ-2 Interpretation: POSITIVE depression screen  PHQ-9 Score: 18   PHQ-9 Interpretation: Moderately severe depression          SEN-7 Flowsheet Screening    Flowsheet Row Most Recent Value   Over the last 2 weeks, how often have you been bothered by any of the following problems? Feeling nervous, anxious, or on edge 3   Not being able to stop or control worrying 3   Worrying too much about different things 3   Trouble relaxing 3   Being so restless that it is hard to sit still 3   Becoming easily annoyed or irritable 3   Feeling afraid as if something awful might happen 3   SEN-7 Total Score 21        MDQ:  4, Synchronous, Minor Problem    H/O ETOH Abuse - In remission  Attends AA once weekly  No sponsor  Never attended rehab  Migraine s Aura - On Maxalt 10mg PRN  Has HA prior to migraine     Has associated nausea  Occurs once every other month  Usually only need to take 1 Maxalt  Previously on Imitrex - D/C due to chest pain  No Neuro consult or brain imaging previously  Hand Dermatitis - Stable  Previously on Elocon PRN  Vitamin D Deficiency - s/p Drisdol  Taking MVI, but not OTC Vitamin D      E- cigarette Smoker - Contemplative  Reviewed:  Labs 8/15/22, Gyn 10/15/20    Sees Gyn Ms  Oliver Seay PA-C at [address]  Next appt 12/22  Sees Dentist q6 months  Sees Optho q2 years      The following portions of the patient's history were reviewed and updated as appropriate: allergies, current medications, past family history, past medical history, past social history, past surgical history and problem list       Review of Systems   Constitutional: Positive for appetite change  Negative for chills, diaphoresis, fatigue and fever  Respiratory: Negative for chest tightness and shortness of breath  Cardiovascular: Negative for chest pain  Gastrointestinal: Positive for nausea  Negative for abdominal pain, blood in stool, diarrhea and vomiting  Genitourinary: Negative for dysuria  Current Outpatient Medications   Medication Sig Dispense Refill    buPROPion (WELLBUTRIN XL) 300 mg 24 hr tablet Take 300 mg by mouth in the morning      multivitamin (THERAGRAN) TABS Take 1 tablet by mouth daily      rizatriptan (MAXALT) 10 mg tablet Take 1 tablet by mouth if needed for migraine       No current facility-administered medications for this visit  Objective:  /82   Pulse 88   Temp 97 7 °F (36 5 °C) (Temporal)   Resp 16   Ht 5' 4" (1 626 m)   Wt 53 9 kg (118 lb 12 8 oz)   SpO2 98%   BMI 20 39 kg/m²    Wt Readings from Last 3 Encounters:   10/04/22 53 9 kg (118 lb 12 8 oz)   08/15/22 53 5 kg (118 lb)   10/15/20 59 2 kg (130 lb 9 6 oz)      BP Readings from Last 3 Encounters:   10/04/22 136/82   08/15/22 134/63   10/15/20 122/64          Physical Exam  Vitals and nursing note reviewed  Constitutional:       Appearance: Normal appearance  She is well-developed and normal weight  HENT:      Head: Normocephalic and atraumatic  Right Ear: Tympanic membrane, ear canal and external ear normal       Left Ear: Tympanic membrane, ear canal and external ear normal       Nose: Nose normal       Right Sinus: No maxillary sinus tenderness or frontal sinus tenderness  Left Sinus: No maxillary sinus tenderness or frontal sinus tenderness        Mouth/Throat:      Mouth: Mucous membranes are moist  Pharynx: Oropharynx is clear  Uvula midline  Tonsils: No tonsillar exudate  Eyes:      Extraocular Movements: Extraocular movements intact  Conjunctiva/sclera: Conjunctivae normal       Pupils: Pupils are equal, round, and reactive to light  Neck:      Thyroid: No thyromegaly  Cardiovascular:      Rate and Rhythm: Normal rate and regular rhythm  Pulses: Normal pulses  Heart sounds: Normal heart sounds  Pulmonary:      Effort: Pulmonary effort is normal       Breath sounds: Normal breath sounds  Abdominal:      General: Bowel sounds are normal  There is no distension  Palpations: Abdomen is soft  There is no mass  Tenderness: There is no abdominal tenderness  There is no guarding or rebound  Musculoskeletal:         General: No swelling or tenderness  Cervical back: Neck supple  Right lower leg: No edema  Left lower leg: No edema  Lymphadenopathy:      Cervical: No cervical adenopathy  Skin:     Findings: No rash  Comments: Right thoracic bact T10 c pedunculated pink and brown skin tag - no rubor, calor, or dolor  Neurological:      General: No focal deficit present  Mental Status: She is alert and oriented to person, place, and time  Psychiatric:         Attention and Perception: Attention and perception normal          Mood and Affect: Mood is depressed  Speech: Speech normal          Behavior: Behavior normal  Behavior is cooperative  Thought Content:  Thought content normal          Cognition and Memory: Cognition and memory normal          Judgment: Judgment normal          Lab Results:      Lab Results   Component Value Date    WBC 6 81 08/15/2022    HGB 14 6 08/15/2022    HCT 42 0 08/15/2022     08/15/2022    ALT 16 08/15/2022    AST 16 08/15/2022     07/14/2015    K 3 6 08/15/2022     08/15/2022    CREATININE 0 78 08/15/2022    BUN 18 08/15/2022    CO2 28 08/15/2022    INR 1 03 07/14/2015     No results found for: Marisela Breed input(s): BASENAME Vitamin D    No results found       POCT Labs

## 2022-10-04 ENCOUNTER — OFFICE VISIT (OUTPATIENT)
Dept: FAMILY MEDICINE CLINIC | Facility: CLINIC | Age: 42
End: 2022-10-04
Payer: MEDICARE

## 2022-10-04 VITALS
TEMPERATURE: 97.7 F | HEART RATE: 88 BPM | RESPIRATION RATE: 16 BRPM | OXYGEN SATURATION: 98 % | HEIGHT: 64 IN | WEIGHT: 118.8 LBS | BODY MASS INDEX: 20.28 KG/M2 | DIASTOLIC BLOOD PRESSURE: 82 MMHG | SYSTOLIC BLOOD PRESSURE: 136 MMHG

## 2022-10-04 DIAGNOSIS — F41.9 ANXIETY: ICD-10-CM

## 2022-10-04 DIAGNOSIS — Z78.9 ELECTRONIC CIGARETTE USE: ICD-10-CM

## 2022-10-04 DIAGNOSIS — Z11.4 SCREENING FOR HIV (HUMAN IMMUNODEFICIENCY VIRUS): ICD-10-CM

## 2022-10-04 DIAGNOSIS — F10.11 HISTORY OF ALCOHOL ABUSE: ICD-10-CM

## 2022-10-04 DIAGNOSIS — R68.89 POOR MOTIVATION: ICD-10-CM

## 2022-10-04 DIAGNOSIS — E55.9 VITAMIN D DEFICIENCY: ICD-10-CM

## 2022-10-04 DIAGNOSIS — Z12.31 ENCOUNTER FOR SCREENING MAMMOGRAM FOR BREAST CANCER: ICD-10-CM

## 2022-10-04 DIAGNOSIS — I10 ESSENTIAL HYPERTENSION: ICD-10-CM

## 2022-10-04 DIAGNOSIS — G43.009 MIGRAINE WITHOUT AURA AND WITHOUT STATUS MIGRAINOSUS, NOT INTRACTABLE: ICD-10-CM

## 2022-10-04 DIAGNOSIS — Z00.00 ANNUAL PHYSICAL EXAM: Primary | ICD-10-CM

## 2022-10-04 DIAGNOSIS — R63.0 DECREASED APPETITE: ICD-10-CM

## 2022-10-04 DIAGNOSIS — Z13.6 SCREENING FOR CARDIOVASCULAR CONDITION: ICD-10-CM

## 2022-10-04 DIAGNOSIS — Z13.1 SCREENING FOR DIABETES MELLITUS: ICD-10-CM

## 2022-10-04 DIAGNOSIS — F33.2 SEVERE EPISODE OF RECURRENT MAJOR DEPRESSIVE DISORDER, WITHOUT PSYCHOTIC FEATURES (HCC): ICD-10-CM

## 2022-10-04 DIAGNOSIS — L30.9 HAND DERMATITIS: ICD-10-CM

## 2022-10-04 DIAGNOSIS — Z11.59 NEED FOR HEPATITIS C SCREENING TEST: ICD-10-CM

## 2022-10-04 PROBLEM — N92.6 IRREGULAR MENSES: Status: RESOLVED | Noted: 2020-10-15 | Resolved: 2022-10-04

## 2022-10-04 PROBLEM — F32.A DEPRESSION: Status: ACTIVE | Noted: 2022-10-04

## 2022-10-04 PROCEDURE — 99203 OFFICE O/P NEW LOW 30 MIN: CPT | Performed by: FAMILY MEDICINE

## 2022-10-04 PROCEDURE — 99386 PREV VISIT NEW AGE 40-64: CPT | Performed by: FAMILY MEDICINE

## 2022-10-04 RX ORDER — BUPROPION HYDROCHLORIDE 300 MG/1
300 TABLET ORAL DAILY
COMMUNITY
Start: 2022-09-07

## 2022-10-04 RX ORDER — RIZATRIPTAN BENZOATE 10 MG/1
1 TABLET ORAL AS NEEDED
COMMUNITY
Start: 2022-09-07

## 2022-10-04 NOTE — PATIENT INSTRUCTIONS
Please contact your insurance if you are uncertain of coverage for plan of care items  Your insurance may not cover the cost of your Vitamin D blood test, which is approximately $65-70  Please notify the lab prior to blood draw if you would like to decline this test       Refer to the back of insurance card for counseling options  Apps and Websites for Counseling, Anxiety/Depression, Chronic Pain, Lifestyle Change, Problem-solving, Self-Esteem, Anger Management, Coping with Uncertainty    (Prices current as of 9/5/19)    1  Mood Kit - Available in Apple Sera Store for $4 99  Supports a person's success in specific situations, includes thought , mood tracker, and journal   Can be accessed in an unstructured way and used as an unguided self-help sera  2   Moodnotes - Available in Apple Sera Store for $4 99  Focuses on healthier thinking habits and identifying "traps" in thinking  Tracks mood over a period of time and identifies factors that influence mood  3   MoodMission - Available in [facility] for free  Includes five "missions" to promote confidence in handling stressors and coping in specific situations  The sera personalizes its style and techniques based on when the user engages it most frequently  In-sera rewards are used to motivate, increase fun and self-confidence  Helpful for patients who need improvement in mood or a decrease in anxiety and depression symptoms  4    What's Up - Available in [email or web address] for free  Recognizes negative thinking patterns and methods to overcome them  Uses helpful metaphors, a catastrophe scale, grounding techniques, and breathing exercises  Has the ability to sync data across multiple devices and protect this information with a unique pass code  Has the capability to be active in forums where people can discuss similar feelings and strategies that have been helpful        5   Moodpath - Available in Apple Sera Store and Google Play for free  Uses daily screenings to create a better understanding of thoughts, feelings, and emotions  When needed, it provides a discussion guide to talking with a medical professional based on the responses to daily screenings  Includes over 150 psychological exercises and videos to promote and strengthen overall mental health  6   MindShift - Available in [employer] for free  Helpful for youth and young adults in coping with anxiety  Creates an individualized toolbox to help users deal with test anxiety, perfectionism, social anxiety, worry, panic, and conflict  Includes directions on how to construct belief experiments to trial common beliefs that feed anxiety, guided relaxation, as well as tools and tips to help establish and accomplish goals  Differentiates between helpful and unhelpful anxiety, and explains how to overcome fears by gradually facing them in manageable steps  7   CBT-I  - Available in [employer] for free  For insomnia  Educates about sleep, developing positive sleep routines, and improved sleep environment  Encourages user to change behaviors which will provide confidence for better sleep on a regular basis  8   Envis  - Free website  Provides self-help and therapy resources that encourages change to combat negative and destructive thought patterns  Includes access to numerous handouts on a variety of symptoms related to anxiety, depression, low self-esteem, panic attacks, social disorders, and more  The solution section has interventions that can be printed and saved for future use  9   Woebot - Available in [employer] for free  Recommended for age 16+    Friendly self-care  that helps you think through situations with step-by-step guidance using counseling tools, learn about yourself with intelligent mood tracking, and master skills to reduce stress and live happier through 100+ evidence-based stories from clinicians  Chat as often or as little as you'd like, whenever you'd like to  10   Kian:  SEUN  CBT DBT Chatbot - Available in Apple appsstore and Google Play for free, has in-eliz purchases  Recommended for 12+  Psychologist support at $30/month, 50% off to start  Lizet Ledesma / Kylie Rodriguez is free  Uses techniques of CBT, DBT, yoga, and meditation to support your needs regarding stress, anxiety, sleep, loss, and a full range of other mental health and wellness issues  11   Curable Pain Relief - Available in Apple Eliz store and Google Play for free, has in-eliz purchases  Teaches about the chronic pain cycle and how to reverse it, while retraining your brain and nervous system for long-term results  Smart  Layne guides user through updates in pain science to identify, target, eliminate the factors that are keeping user "stuck" in the pain cycle  12   Talkspace Online Therapy - Available in Apple Eliz store and Google Play for a fee  Subscription service, starting at $59/week (billed monthly)  All plans include unlimited messaging, and add live video sessions if desired  Unlimited messaging therapy for teens ages 15-14 and special services for couples therapy  You can change therapists or stop subscription renewal at any time  Recommended for 13+  User is matched with a licensed therapist in your state and communicate on your device by text, audio, and video from anywhere, at any time  User will hear back at least once a day, 5 days per week        Counseling services:    [facility]   82 [facility], [address] [place] [address], [facility], [address] [facility]  [phone]     [address] [place] [address] (they have equine therapy too)  8 [facility], [place] [id number],  [facility], [address] [facility]  [address], [address],  [place], [address]    70 [facility], [address] [place] [address]   610 [address]  [address] [facility], [address]  [place], [address]    162 [facility]   [address] [facility] 36, [place], [address]     [address] [facility]   [address] [facility], [place], [address] [facility] [address]    2510 [facility]  41 [facility], [address]  [address]  [facility] 404 [place] [address]  [email or web address]     Merlin Prey, MSW, TORW  (patients age 11-adult)   [address] [place] [facility], 243 [facility], [address], [address]   [facility], [facility]5 [address] [place] [address] Ne  1000 [facility], 1777 [facility] 2131 [facility], [facility], [place], [address]  [facility] 108 (specializing in addiction)  [facility], [address]  1441 [facility]  [address], [place], 6100 [facility]   [address]      [address], [address] [facility] , [address], [place], [address], [place], [facility], 2121 [facility]  100 [facility], [address], [place], [address]  [address] [address], 2601 [facility], [address]   [phone]    Richa Paola, [address] [facility] [address]  [address] [place] [id number] [phone]      Hotlines:   Please program these numbers into your phone in case you or someone you know needs them  All services are free  65  People who call, text, or chat with 988 will be directly connected to the [address] [facility]  The existing Lifeline phone number ([phone]) will remain available  Callers can also connect with the [facility] or assistance in [address]   988 can be used by anyone, any time, at no cost  Trained crisis response professionals can support individuals considering suicide, self-harm, or any behavioral, substance abuse or misuse or mental health need for themselves or people looking for help for a loved one experiencing a mental health crisis  Lifeline services are available 24 hours a day, seven days a week at no cost to the caller  Crisis Text Line: text 995933     You are connected to a Crisis Counselor to bring a hot moment to a cool calm through active listening and collaborative problem solving  WarmLine:  [phone] or 333-140-8555   They provide a supportive listening ear if you need it  They also can also provide information about mental health concerns and services  Crisis Line:  [facility] [phone],  [facility] [phone], [address] [place] [address] and [place]: [phone]   24/7 crisis counseling, on-site counseling and walk-in counseling services available  National Suicide Prevention Lifeline:  6-450-633-335-548-3300  En [address] [facility] [phone]   This is free, confidential, and available 24/7  [place]: [phone]   For those facing or having survived abuse 24 hour confidential help line, emergency safe house, counseling, advocacy and education  If you or someone your know are feeling as though you are going to hurt yourself, do not wait - GET HELP RIGHT AWAY  Go to the closest Emergency Room, call 211, or call someone you trust, family member or friend to be with you until you can get some help  Wellness Visit for Adults   AMBULATORY CARE:   A wellness visit  is when you see your healthcare provider to get screened for health problems  Your healthcare provider will also give you advice on how to stay healthy  Write down your questions so you remember to ask them  Ask your healthcare provider how often you should have a wellness visit  What happens at a wellness visit:  Your healthcare provider will ask about your health, and your family history of health problems   This includes high blood pressure, heart disease, and cancer  He or she will ask if you have symptoms that concern you, if you smoke, and about your mood  You may also be asked about your intake of medicines, supplements, food, and alcohol  Any of the following may be done:  · Your weight  will be checked  Your height may also be checked so your body mass index (BMI) can be calculated  Your BMI shows if you are at a healthy weight  · Your blood pressure  and heart rate will be checked  Your temperature may also be checked  · Blood and urine tests  may be done  Blood tests may be done to check your cholesterol levels  Abnormal cholesterol levels increase your risk for heart disease and stroke  You may also need a blood or urine test to check for diabetes if you are at increased risk  Urine tests may be done to look for signs of an infection or kidney disease  · A physical exam  includes checking your heartbeat and lungs with a stethoscope  Your healthcare provider may also check your skin to look for sun damage  · Screening tests  may be recommended  A screening test is done to check for diseases that may not cause symptoms  The screening tests you may need depend on your age, gender, family history, and lifestyle habits  For example, colorectal screening may be recommended if you are 48years old or older  Screening tests you need if you are a woman:   · A Pap smear  is used to screen for cervical cancer  Pap smears are usually done every 3 to 5 years depending on your age  You may need them more often if you have had abnormal Pap smear test results in the past  Ask your healthcare provider how often you should have a Pap smear  · A mammogram  is an x-ray of your breasts to screen for breast cancer  Experts recommend mammograms every 2 years starting at age 48 years  You may need a mammogram at age 52 years or younger if you have an increased risk for breast cancer   Talk to your healthcare provider about when you should start having mammograms and how often you need them  Vaccines you may need:   · Get an influenza vaccine  every year  The influenza vaccine protects you from the flu  Several types of viruses cause the flu  The viruses change over time, so new vaccines are made each year  · Get a tetanus-diphtheria (Td) booster vaccine  every 10 years  This vaccine protects you against tetanus and diphtheria  Tetanus is a severe infection that may cause painful muscle spasms and lockjaw  Diphtheria is a severe bacterial infection that causes a thick covering in the back of your mouth and throat  · Get a human papillomavirus (HPV) vaccine  if you are female and aged 23 to 32 or male 23 to 24 and never received it  This vaccine protects you from HPV infection  HPV is the most common infection spread by sexual contact  HPV may also cause vaginal, penile, and anal cancers  · Get a pneumococcal vaccine  if you are aged 72 years or older  The pneumococcal vaccine is an injection given to protect you from pneumococcal disease  Pneumococcal disease is an infection caused by pneumococcal bacteria  The infection may cause pneumonia, meningitis, or an ear infection  · Get a shingles vaccine  if you are 60 or older, even if you have had shingles before  The shingles vaccine is an injection to protect you from the varicella-zoster virus  This is the same virus that causes chickenpox  Shingles is a painful rash that develops in people who had chickenpox or have been exposed to the virus  How to eat healthy:  My Plate is a model for planning healthy meals  It shows the types and amounts of foods that should go on your plate  Fruits and vegetables make up about half of your plate, and grains and protein make up the other half  A serving of dairy is included on the side of your plate  The amount of calories and serving sizes you need depends on your age, gender, weight, and height   Examples of healthy foods are listed below:  · Eat a variety of vegetables  such as dark green, red, and orange vegetables  You can also include canned vegetables low in sodium (salt) and frozen vegetables without added butter or sauces  · Eat a variety of fresh fruits , canned fruit in 100% juice, frozen fruit, and dried fruit  · Include whole grains  At least half of the grains you eat should be whole grains  Examples include whole-wheat bread, wheat pasta, brown rice, and whole-grain cereals such as oatmeal     · Eat a variety of protein foods such as seafood (fish and shellfish), lean meat, and poultry without skin (turkey and chicken)  Examples of lean meats include pork leg, shoulder, or tenderloin, and beef round, sirloin, tenderloin, and extra lean ground beef  Other protein foods include eggs and egg substitutes, beans, peas, soy products, nuts, and seeds  · Choose low-fat dairy products such as skim or 1% milk or low-fat yogurt, cheese, and cottage cheese  · Limit unhealthy fats  such as butter, hard margarine, and shortening  Exercise:  Exercise at least 30 minutes per day on most days of the week  Some examples of exercise include walking, biking, dancing, and swimming  You can also fit in more physical activity by taking the stairs instead of the elevator or parking farther away from stores  Include muscle strengthening activities 2 days each week  Regular exercise provides many health benefits  It helps you manage your weight, and decreases your risk for type 2 diabetes, heart disease, stroke, and high blood pressure  Exercise can also help improve your mood  Ask your healthcare provider about the best exercise plan for you  General health and safety guidelines:   · Do not smoke  Nicotine and other chemicals in cigarettes and cigars can cause lung damage  Ask your healthcare provider for information if you currently smoke and need help to quit  E-cigarettes or smokeless tobacco still contain nicotine  Talk to your healthcare provider before you use these products  · Limit alcohol  A drink of alcohol is 12 ounces of beer, 5 ounces of wine, or 1½ ounces of liquor  · Lose weight, if needed  Being overweight increases your risk of certain health conditions  These include heart disease, high blood pressure, type 2 diabetes, and certain types of cancer  · Protect your skin  Do not sunbathe or use tanning beds  Use sunscreen with a SPF 15 or higher  Apply sunscreen at least 15 minutes before you go outside  Reapply sunscreen every 2 hours  Wear protective clothing, hats, and sunglasses when you are outside  · Drive safely  Always wear your seatbelt  Make sure everyone in your car wears a seatbelt  A seatbelt can save your life if you are in an accident  Do not use your cell phone when you are driving  This could distract you and cause an accident  Pull over if you need to make a call or send a text message  · Practice safe sex  Use latex condoms if are sexually active and have more than one partner  Your healthcare provider may recommend screening tests for sexually transmitted infections (STIs)  · Wear helmets, lifejackets, and protective gear  Always wear a helmet when you ride a bike or motorcycle, go skiing, or play sports that could cause a head injury  Wear protective equipment when you play sports  Wear a lifejacket when you are on a boat or doing water sports  © Copyright [employer] 2022 Information is for End User's use only and may not be sold, redistributed or otherwise used for commercial purposes  All illustrations and images included in CareNotes® are the copyrighted property of A D A M , Inc  or [facility] Geronimo Domingo   The above information is an  only  It is not intended as medical advice for individual conditions or treatments  Talk to your doctor, nurse or pharmacist before following any medical regimen to see if it is safe and effective for you      Cigarette Smoking and Your Health   AMBULATORY CARE:   Risks to your health if you smoke:  Nicotine and other chemicals found in tobacco and e-cigarettes can damage every cell in your body  Even if you are a light smoker, you have an increased risk for cancer, heart disease, and lung disease  If you are pregnant or have diabetes, smoking increases your risk for complications  Nicotine can affect an adolescent's developing brain  This can lead to trouble thinking, learning, or paying attention  Benefits to your health if you stop smoking:   · You decrease respiratory symptoms such as coughing, wheezing, and shortness of breath  · You reduce your risk for cancers of the lung, mouth, throat, kidney, bladder, pancreas, stomach, and cervix  If you already have cancer, you increase the benefits of chemotherapy  You also reduce your risk for cancer returning or a second cancer from developing  · You reduce your risk for heart disease, blood clots, heart attack, and stroke  · You reduce your risk for lung infections, and diseases such as pneumonia, asthma, chronic bronchitis, and emphysema  · Your circulation improves  More oxygen can be delivered to your body  If you have diabetes, you lower your risk for complications, such as kidney, artery, and eye diseases  You also lower your risk for nerve damage  Nerve damage can lead to amputations, poor vision, and blindness  · You improve your body's ability to heal and to fight infections  · An adolescent can help his or her brain and body develop in a healthy way  Talk to your adolescent about all the health risks of nicotine  If you can, start talking about nicotine when your child is younger than 12 years  This may make it easier for him or her not to start using nicotine as a teenager or adult  Explain to him or her that it is best never to start  It can be hard to try to quit later      Benefits to the health of others if you stop smoking:  Tobacco is harmful to nonsmokers who breathe in your secondhand smoke  The following are ways the health of others around you may improve when you stop smoking:  · You lower the risks for lung cancer and heart disease in nonsmoking adults  · If you are pregnant, you lower the risk for miscarriage, early delivery, low birth weight, and stillbirth  You also lower your baby's risk for SIDS, obesity, developmental delay, and neurobehavioral problems, such as ADHD  · If you have children, you lower their risk for ear infections, colds, pneumonia, bronchitis, and asthma  Follow up with your doctor as directed:  Write down your questions so you remember to ask them during your visits  For support and more information:   · American Lung Association  [address] [facility]  [address]  Phone: [facility] [address]  Phone: 7- 065 - [phone]  Web Address: [place] [employer]    · Smokefree  gov  Phone: 0- 896 - 542-9880  Web Address: www smokefree  [facility] 21 2022 Information is for End User's use only and may not be sold, redistributed or otherwise used for commercial purposes  All illustrations and images included in CareNotes® are the copyrighted property of A D A M , Inc  or [address] [place] [address]  The above information is an  only  It is not intended as medical advice for individual conditions or treatments  Talk to your doctor, nurse or pharmacist before following any medical regimen to see if it is safe and effective for you  Cholesterol and Your Health   AMBULATORY CARE:   Cholesterol  is a waxy, fat-like substance  Your body uses cholesterol to make hormones and new cells, and to protect nerves  Cholesterol is made by your body  It also comes from certain foods you eat, such as meat and dairy products  Your healthcare provider can help you set goals for your cholesterol levels  He or she can help you create a plan to meet your goals  Cholesterol level goals:   Your cholesterol level goals depend on your risk for heart disease, your age, and your other health conditions  The following are general guidelines:  · Total cholesterol  includes low-density lipoprotein (LDL), high-density lipoprotein (HDL), and triglyceride levels  The total cholesterol level should be lower than 200 mg/dL and is best at about 150 mg/dL  · LDL cholesterol  is called bad cholesterol  because it forms plaque in your arteries  As plaque builds up, your arteries become narrow, and less blood flows through  When plaque decreases blood flow to your heart, you may have chest pain  If plaque completely blocks an artery that brings blood to your heart, you may have a heart attack  Plaque can break off and form blood clots  Blood clots may block arteries in your brain and cause a stroke  The level should be less than 130 mg/dL and is best at about 100 mg/dL  · HDL cholesterol  is called good cholesterol  because it helps remove LDL cholesterol from your arteries  It does this by attaching to LDL cholesterol and carrying it to your liver  Your liver breaks down LDL cholesterol so your body can get rid of it  High levels of HDL cholesterol can help prevent a heart attack and stroke  Low levels of HDL cholesterol can increase your risk for heart disease, heart attack, and stroke  The level should be 60 mg/dL or higher  · Triglycerides  are a type of fat that store energy from foods you eat  High levels of triglycerides also cause plaque buildup  This can increase your risk for a heart attack or stroke  If your triglyceride level is high, your LDL cholesterol level may also be high  The level should be less than 150 mg/dL      Any of the following can increase your risk for high cholesterol:   · Smoking cigarettes    · Being overweight or obese, or not getting enough exercise    · Drinking large amounts of alcohol    · A medical condition such as hypertension (high blood pressure) or diabetes    · Certain genes passed from your parents to you    · Age older than 72 years    What you need to know about having your cholesterol levels checked: Adults 21to 39years of age should have their cholesterol levels checked every 4 to 6 years  Adults 45 years or older should have their cholesterol checked every 1 to 2 years  You may need your cholesterol checked more often, or at a younger age, if you have risk factors for heart disease  You may also need to have your cholesterol checked more often if you have other health conditions, such as diabetes  Blood tests are used to check cholesterol levels  Blood tests measure your levels of triglycerides, LDL cholesterol, and HDL cholesterol  How healthy fats affect your cholesterol levels:  Healthy fats, also called unsaturated fats, help lower LDL cholesterol and triglyceride levels  Healthy fats include the following:  · Monounsaturated fats  are found in foods such as olive oil, canola oil, avocado, nuts, and olives  · Polyunsaturated fats,  such as omega 3 fats, are found in fish, such as salmon, trout, and tuna  They can also be found in plant foods such as flaxseed, walnuts, and soybeans  How unhealthy fats affect your cholesterol levels:  Unhealthy fats increase LDL cholesterol and triglyceride levels  They are found in foods high in cholesterol, saturated fat, and trans fat:  · Cholesterol  is found in eggs, dairy, and meat  · Saturated fat  is found in butter, cheese, ice cream, whole milk, and coconut oil  Saturated fat is also found in meat, such as sausage, hot dogs, and bologna  · Trans fat  is found in liquid oils and is used in fried and baked foods  Foods that contain trans fats include chips, crackers, muffins, sweet rolls, microwave popcorn, and cookies  Treatment  for high cholesterol will also decrease your risk of heart disease, heart attack, and stroke   Treatment may include any of the following:  · Lifestyle changes  may include food, exercise, weight loss, and quitting smoking  You may also need to decrease the amount of alcohol you drink  Your healthcare provider will want you to start with lifestyle changes  Other treatment may be added if lifestyle changes are not enough  Your healthcare provider may recommend you work with a team to manage hyperlipidemia  The team may include medical experts such as a dietitian, an exercise or physical therapist, and a behavior therapist  Your family members may be included in helping you create lifestyle changes  · Medicines  may be given to lower your LDL cholesterol, triglyceride levels, or total cholesterol level  You may need medicines to lower your cholesterol if any of the following is true:    ? You have a history of stroke, TIA, unstable angina, or a heart attack  ? Your LDL cholesterol level is 190 mg/dL or higher  ? You are age 36 to 76 years, have diabetes or heart disease risk factors, and your LDL cholesterol is 70 mg/dL or higher  · Supplements  include fish oil, red yeast rice, and garlic  Fish oil may help lower your triglyceride and LDL cholesterol levels  It may also increase your HDL cholesterol level  Red yeast rice may help decrease your total cholesterol level and LDL cholesterol level  Garlic may help lower your total cholesterol level  Do not take any supplements without talking to your healthcare provider  Food changes you can make to lower your cholesterol levels:  A dietitian can help you create a healthy eating plan  He or she can show you how to read food labels and choose foods low in saturated fat, trans fats, and cholesterol  · Decrease the total amount of fat you eat  Choose lean meats, fat-free or 1% fat milk, and low-fat dairy products, such as yogurt and cheese  Try to limit or avoid red meats  Limit or do not eat fried foods or baked goods, such as cookies  · Replace unhealthy fats with healthy fats  Cook foods in olive oil or canola oil   Choose soft margarines that are low in saturated fat and trans fat  Seeds, nuts, and avocados are other examples of healthy fats  · Eat foods with omega-3 fats  Examples include salmon, tuna, mackerel, walnuts, and flaxseed  Eat fish 2 times per week  Pregnant women should not eat fish that have high levels of mercury, such as shark, swordfish, and maribel mackerel  · Increase the amount of high-fiber foods you eat  High-fiber foods can help lower your LDL cholesterol  Aim to get between 20 and 30 grams of fiber each day  Fruits and vegetables are high in fiber  Eat at least 5 servings each day  Other high-fiber foods are whole-grain or whole-wheat breads, pastas, or cereals, and brown rice  Eat 3 ounces of whole-grain foods each day  Increase fiber slowly  You may have abdominal discomfort, bloating, and gas if you add fiber to your diet too quickly  · Eat healthy protein foods  Examples include low-fat dairy products, skinless chicken and turkey, fish, and nuts  · Limit foods and drinks that are high in sugar  Your dietitian or healthcare provider can help you create daily limits for high-sugar foods and drinks  The limit may be lower if you have diabetes or another health condition  Limits can also help you lose weight if needed  Lifestyle changes you can make to lower your cholesterol levels:   · Maintain a healthy weight  Ask your healthcare provider what a healthy weight is for you  Ask him or her to help you create a weight loss plan if needed  Weight loss can decrease your total cholesterol and triglyceride levels  Weight loss may also help keep your blood pressure at a healthy level  · Be physically active throughout the day  Physical activity, such as exercise, can help lower your total cholesterol level and maintain a healthy weight  Physical activity can also help increase your HDL cholesterol level  Work with your healthcare provider to create an program that is right for you   Get at least 30 to 40 minutes of moderate physical activity most days of the week  Examples of exercise include brisk walking, swimming, or biking  Also include strength training at least 2 times each week  Your healthcare providers can help you create a physical activity plan  · Do not smoke  Nicotine and other chemicals in cigarettes and cigars can raise your cholesterol levels  Ask your healthcare provider for information if you currently smoke and need help to quit  E-cigarettes or smokeless tobacco still contain nicotine  Talk to your healthcare provider before you use these products  · Limit or do not drink alcohol  Alcohol can increase your triglyceride levels  Ask your healthcare provider before you drink alcohol  Ask how much is okay for you to drink in 24 hours or 1 week  Follow up with your doctor as directed:  Write down your questions so you remember to ask them during your visits  © Copyright [employer] 2022 Information is for End User's use only and may not be sold, redistributed or otherwise used for commercial purposes  All illustrations and images included in CareNotes® are the copyrighted property of A D A M [employer]  or [facility] Geronimo Domingo   The above information is an  only  It is not intended as medical advice for individual conditions or treatments  Talk to your doctor, nurse or pharmacist before following any medical regimen to see if it is safe and effective for you

## 2022-10-05 ENCOUNTER — TELEPHONE (OUTPATIENT)
Dept: PSYCHOLOGY | Facility: CLINIC | Age: 42
End: 2022-10-05

## 2022-10-05 NOTE — ASSESSMENT & PLAN NOTE
Uncontrolled  Pending partial inpatient Psych admission  Will likely need D/C Wellbutrin XL 300mg QD  Smart phone eliz list and counseling list provided today

## 2022-10-05 NOTE — PROGRESS NOTES
Assessment/Plan:  Problem List Items Addressed This Visit        Cardiovascular and Mediastinum    Migraine     Stable on Maxalt 10 mg p r n       For headache and migraine prevention I would suggest a combination vitamin supplement which may include 1 or more of the following ingredients:  Magnesium 400 mg , Riboflavin (vitamin B2) 400 - 600 mg,  Butterbur 150 mg (PA free)  Other ingredients that may be helpful are feverfew, coenzyme Q10 100 mg three times a day,  melatonin and kiley  Some example brands that combine some of these ingredients are Migravent or Dolovent  Relevant Medications    rizatriptan (MAXALT) 10 mg tablet    buPROPion (WELLBUTRIN XL) 300 mg 24 hr tablet    Other Relevant Orders    Magnesium    RESOLVED: Essential hypertension    Relevant Orders    CBC and differential       Musculoskeletal and Integument    Hand dermatitis     Stable s meds  Other    Anxiety     Uncontrolled  Pending partial inpatient Psych admission  Will likely need D/C Wellbutrin XL 300mg QD  Smart phone eliz list and counseling list provided today  Relevant Orders    Ambulatory Referral to [facility] or Partial [facility] Program    CBC and differential    Vitamin D deficiency     Pending labs  Relevant Orders    Comprehensive metabolic panel    Vitamin D 25 hydroxy    Depression     Uncontrolled  Pending partial inpatient Psych admission  Will likely need D/C Wellbutrin XL 300mg QD  Smart phone eliz list and counseling list provided today  Relevant Medications    buPROPion (WELLBUTRIN XL) 300 mg 24 hr tablet    Other Relevant Orders    Ambulatory Referral to [facility] or [facility] Program    CBC and differential    Comprehensive metabolic panel    TSH, 3rd generation with Free T4 reflex    Electronic cigarette use     Contemplative  Electronic cigarette smoking cessation advised  History of alcohol abuse     In remission  Continue AA           Relevant Orders    Ambulatory Referral to [facility] or [facility] Program      Other Visit Diagnoses     Annual physical exam    -  Primary    Encounter for screening mammogram for breast cancer        Relevant Orders    Mammo screening bilateral w 3d & cad    Screening for diabetes mellitus        Relevant Orders    Hemoglobin A1C    Screening for cardiovascular condition        Relevant Orders    CBC and differential    Comprehensive metabolic panel    Lipid panel    LDL cholesterol, direct    Screening for HIV (human immunodeficiency virus)        Relevant Orders    HIV 1/2 Antigen/Antibody (4th Generation) w Reflex [facility]    Need for hepatitis C screening test        Relevant Orders    Hepatitis C antibody    Decreased appetite        Relevant Orders    Ambulatory Referral to [facility] or [facility] Program    TSH, 3rd generation with Free T4 reflex    Magnesium    Poor motivation        Relevant Orders    Ambulatory Referral to [facility] or [facility] Program    TSH, 3rd generation with Free T4 reflex           Return in about 6 weeks (around 11/15/2022) for 40min  F/U Anx/Dep, Migraine, Vit D Def, ECig, Labs; PRN Partial Psych D/C   Future Appointments   Date Time Provider Govind Tavera   11/16/2022 10:00 AM Dk Rivera DO [facility]   12/5/2022  3:20 PM Rachel Paredes MD [facility]        Subjective:     Nedra Case is a 43 y o  female who presents today for a follow-up on her chronic medical conditions  HPI:  Chief Complaint   Patient presents with    Anxiety     Dx with anxiety in the past  Pt's son passed away three months ago  Current sx are panic attacks, not eating enough, losing weight   Nevus     Mole on the mid back  pt feels as though the mole has gotten darker and is "uneven looking " Would like it to be checked   HM     Last Tdap she believes was in the last few years  Pt has two covid doses, has not gotten any boosters   Unknown if she was screened for Hep C previously  Pt has not had a mammo yet  Needs order for mammo  -- Above per clinical staff and reviewed  --      HPI      Today:      Physical    Not watching diet  +Exercise  Dottie Gagnon Ms  Froilan Banks PA-C at [id number] [facility]  Next appt 12/22       Sees Dentist q6 months  Sees Optho q2 years  Health Maintenance   Topic Date Due    Hepatitis C Screening  Never done    HIV Screening  Never done    Breast Cancer Screening: Mammogram  Never done    COVID-19 Vaccine (3 - Booster for Moderna series) 10/08/2021    Influenza Vaccine (1) 06/30/2023 (Originally 9/1/2022)    DTaP,Tdap,and Td Vaccines (1 - Tdap) 10/04/2023 (Originally 3/21/2001)    BMI: Adult  10/04/2023    Annual Physical  10/04/2023    Cervical Cancer Screening  08/02/2024    Pneumococcal Vaccine: Pediatrics (0 to 5 Years) and At-Risk Patients (6 to 59 Years)  Aged Out    HIB Vaccine  Aged Out    Hepatitis B Vaccine  Aged Out    IPV Vaccine  Aged Out    Hepatitis A Vaccine  Aged Out    Meningococcal ACWY Vaccine  Aged Out    HPV Vaccine  Aged Out         The following portions of the patient's history were reviewed and updated as appropriate: allergies, current medications, past family history, past medical history, past social history, past surgical history and problem list       Review of Systems     See other note  Current Outpatient Medications   Medication Sig Dispense Refill    buPROPion (WELLBUTRIN XL) 300 mg 24 hr tablet Take 300 mg by mouth in the morning      multivitamin (THERAGRAN) TABS Take 1 tablet by mouth daily      rizatriptan (MAXALT) 10 mg tablet Take 1 tablet by mouth if needed for migraine       No current facility-administered medications for this visit         Objective:  /82   Pulse 88   Temp 97 7 °F (36 5 °C) (Temporal)   Resp 16   Ht 5' 4" (1 626 m)   Wt 53 9 kg (118 lb 12 8 oz)   SpO2 98%   BMI 20 39 kg/m²    Wt Readings from Last 3 Encounters:   10/04/22 53 9 kg (118 lb 12 8 oz)   08/15/22 53 5 kg (118 lb)   10/15/20 59 2 kg (130 lb 9 6 oz)      BP Readings from Last 3 Encounters:   10/04/22 136/82   08/15/22 134/63   10/15/20 122/64          Physical Exam     Vitals and nursing note reviewed  Constitutional:       Appearance: Normal appearance  She is well-developed and normal weight  HENT:      Head: Normocephalic and atraumatic  Right Ear: Tympanic membrane, ear canal and external ear normal       Left Ear: Tympanic membrane, ear canal and external ear normal       Nose: Nose normal       Right Sinus: No maxillary sinus tenderness or frontal sinus tenderness  Left Sinus: No maxillary sinus tenderness or frontal sinus tenderness  Mouth/Throat:      Mouth: Mucous membranes are moist       Pharynx: Oropharynx is clear  Uvula midline  Tonsils: No tonsillar exudate  Eyes:      Extraocular Movements: Extraocular movements intact  Conjunctiva/sclera: Conjunctivae normal       Pupils: Pupils are equal, round, and reactive to light  Neck:      Thyroid: No thyromegaly  Cardiovascular:      Rate and Rhythm: Normal rate and regular rhythm  Pulses: Normal pulses  Heart sounds: Normal heart sounds  Pulmonary:      Effort: Pulmonary effort is normal       Breath sounds: Normal breath sounds  Abdominal:      General: Bowel sounds are normal  There is no distension  Palpations: Abdomen is soft  There is no mass  Tenderness: There is no abdominal tenderness  There is no guarding or rebound  Musculoskeletal:         General: No swelling or tenderness  Cervical back: Neck supple  Right lower leg: No edema  Left lower leg: No edema  Lymphadenopathy:      Cervical: No cervical adenopathy  Skin:     Findings: No rash  Comments: Right thoracic bact T10 c pedunculated pink and brown skin tag - no rubor, calor, or dolor  Neurological:      General: No focal deficit present  Mental Status: She is alert and oriented to person, place, and time  Psychiatric:         Attention and Perception: Attention and perception normal          Mood and Affect: Mood is depressed  Speech: Speech normal          Behavior: Behavior normal  Behavior is cooperative  Thought Content: Thought content normal          Cognition and Memory: Cognition and memory normal          Judgment: Judgment normal      Lab Results:      Lab Results   Component Value Date    WBC 6 81 08/15/2022    HGB 14 6 08/15/2022    HCT 42 0 08/15/2022     08/15/2022    ALT 16 08/15/2022    AST 16 08/15/2022     07/14/2015    K 3 6 08/15/2022     08/15/2022    CREATININE 0 78 08/15/2022    BUN 18 08/15/2022    CO2 28 08/15/2022    INR 1 03 07/14/2015     No results found for: URICACID  Invalid input(s): BASENAME Vitamin D    No results found       POCT Labs

## 2022-10-05 NOTE — ASSESSMENT & PLAN NOTE
Stable on Maxalt 10 mg p r n       For headache and migraine prevention I would suggest a combination vitamin supplement which may include 1 or more of the following ingredients:  Magnesium 400 mg , Riboflavin (vitamin B2) 400 - 600 mg,  Butterbur 150 mg (PA free)  Other ingredients that may be helpful are feverfew, coenzyme Q10 100 mg three times a day,  melatonin and kiley  Some example brands that combine some of these ingredients are Migravent or Dolovent

## 2022-10-11 ENCOUNTER — OFFICE VISIT (OUTPATIENT)
Dept: PSYCHOLOGY | Facility: CLINIC | Age: 42
End: 2022-10-11
Payer: COMMERCIAL

## 2022-10-11 ENCOUNTER — OFFICE VISIT (OUTPATIENT)
Dept: PSYCHIATRY | Facility: CLINIC | Age: 42
End: 2022-10-11
Payer: COMMERCIAL

## 2022-10-11 VITALS
WEIGHT: 116 LBS | HEART RATE: 75 BPM | DIASTOLIC BLOOD PRESSURE: 85 MMHG | HEIGHT: 65 IN | BODY MASS INDEX: 19.33 KG/M2 | SYSTOLIC BLOOD PRESSURE: 128 MMHG

## 2022-10-11 DIAGNOSIS — F33.2 SEVERE EPISODE OF RECURRENT MAJOR DEPRESSIVE DISORDER, WITHOUT PSYCHOTIC FEATURES (HCC): ICD-10-CM

## 2022-10-11 DIAGNOSIS — F41.1 GENERALIZED ANXIETY DISORDER: Primary | ICD-10-CM

## 2022-10-11 DIAGNOSIS — F33.2 MAJOR DEPRESSIVE DISORDER, RECURRENT SEVERE WITHOUT PSYCHOTIC FEATURES (HCC): ICD-10-CM

## 2022-10-11 PROCEDURE — 90791 PSYCH DIAGNOSTIC EVALUATION: CPT | Performed by: PSYCHIATRY & NEUROLOGY

## 2022-10-11 PROCEDURE — 90791 PSYCH DIAGNOSTIC EVALUATION: CPT

## 2022-10-11 RX ORDER — ESCITALOPRAM OXALATE 10 MG/1
10 TABLET ORAL DAILY
Qty: 30 TABLET | Refills: 0 | Status: SHIPPED | OUTPATIENT
Start: 2022-10-11

## 2022-10-11 NOTE — PSYCH
This note was not shared with the patient due to reasonable likelihood of causing patient harm    Reason for visit:   Chief Complaint   Patient presents with   • Depression   • Anxiety       HPI     Abdulaziz Eddie is a 43 y o  female with depression,  anxiety, migraine and hand dermatitis referred by her primary physician because  has been feeling more depressed and anxious, poor appetite with weight loss  Onset of symptoms was a few months ago with rapidly worsening course since that time  Psychosocial Stressors:Her son passed away 3 months ago   She states that has been suffering from depression for 5 years after her divorce but this became worse after her oldest son passed on 7/9/22  He was using opioids , went to rehab but appear that after he came back from rehab started using again and passed 2 weeks after  He was in his father house when his father found him  She states her anxiety has became worse, she wake up with panic episodes, she went to the ER once with chest pain that appear was related to severe anxiety  She states worries about her youngest daughter who is in high school and at that age was when her son started to use drugs, the child is going to therapy  At this time is only interested in parenting but has anhedonia, is feeling distractive, has sleep disturbances, no appetite  She states that she and her ex- are good friend, she has a boyfriend who lives with her  She has some support system from a group that she goes  Nati Barnes feels depressed, cries during the interview, very anxious, has death wishes , but denies any plan or intent  She feels hopeless, decreased concentration, and forgetful  She denies any psychotic symptoms, nor manic episodes  Her PHQ-9 is 17         Review Of Systems:     Mood Anxiety and Depression   Behavior Normal    Thought Content Normal   General Sleep Disturbances and Decreased Functioning   Personality Normal   Other Psych Symptoms Normal Constitutional Negative   ENT Negative   Cardiovascular Negative   Respiratory Negative   Gastrointestinal Negative   Genitourinary Negative   Musculoskeletal Negative   Integumentary Negative   Neurological Negative   Endocrine Normal    Other Symptoms Normal        Past Psychiatric History:      Past Inpatient Psychiatric Treatment:   None   Past Outpatient Psychiatric Treatment:    She has depression and anxiety , her PCP prescribe medications  Past Suicide Attempts:    no  Past Violent Behavior:    no  Past Psychiatric Medication Trials: Wellbutrin XL    Family Psychiatric History:   Family History   Problem Relation Age of Onset   • Stroke Mother 54        Cause unknown   • Congenital heart disease Mother    • Valvular heart disease Mother         Mitral Valve   • Hypertension Father    • Diabetes type II Father    • Stroke Brother 32        Cause unknown   • Depression Brother    • Anxiety disorder Brother    • Schizophrenia Maternal Uncle    • Stroke Maternal Grandfather    • No Known Problems Daughter    • No Known Problems Daughter    • Drug abuse Son         Oxycodone, Percocet, Fentanyl       Social History:    Education: high school diploma/GED  Learning Disabilities: none  Marital history:   Living arrangement, social support: she lives with her 2 daughters and her boyfriend  Occupational History: works as a [employer]  Functioning Relationships: good support system    Other Pertinent History: no legal or  history     Social History     Substance and Sexual Activity   Drug Use Yes   • Types: Marijuana    Comment: medical card use every day       Traumatic History:       Abuse: none  Other Traumatic Events: none        No history of head injury  No history of seizure    The following portions of the patient's history were reviewed and updated as appropriate:   She  has a past medical history of Anxiety, Chlamydia infection, Depression, Electronic cigarette use, Hand dermatitis, History of alcohol abuse, Hypertension, Migraine, and Vitamin D deficiency  She   Patient Active Problem List    Diagnosis Date Noted   • Depression 10/04/2022   • Electronic cigarette use 10/04/2022   • History of alcohol abuse 10/04/2022   • Hand dermatitis 09/23/2021   • Encounter for gynecological examination (general) (routine) without abnormal findings 10/15/2020   • Encounter for screening mammogram for malignant neoplasm of breast 10/15/2020   • Anxiety 03/27/2017   • Migraine 07/16/2015   • Vitamin D deficiency 04/07/2015     She  has a past surgical history that includes Cholecystectomy; [place] tooth extraction (03/21/1998); and Tubal ligation  Her family history includes Anxiety disorder in her brother; Congenital heart disease in her mother; Depression in her brother; Diabetes type II in her father; Drug abuse in her son; Hypertension in her father; No Known Problems in her daughter and daughter; Schizophrenia in her maternal uncle; Stroke in her maternal grandfather; Stroke (age of onset: 32) in her brother; Stroke (age of onset: 54) in her mother; Valvular heart disease in her mother  She  reports that she quit smoking about 5 years ago  Her smoking use included cigarettes  She started smoking about 27 years ago  She has a 15 00 pack-year smoking history  She has never used smokeless tobacco  She reports previous alcohol use  She reports current drug use  Drug: Marijuana  Current Outpatient Medications   Medication Sig Dispense Refill   • buPROPion (WELLBUTRIN XL) 300 mg 24 hr tablet Take 300 mg by mouth in the morning     • escitalopram (Lexapro) 10 mg tablet Take 1 tablet (10 mg total) by mouth daily 30 tablet 0   • multivitamin (THERAGRAN) TABS Take 1 tablet by mouth daily     • rizatriptan (MAXALT) 10 mg tablet Take 1 tablet by mouth if needed for migraine       No current facility-administered medications for this visit  She is allergic to contrast [iodinated diagnostic agents]          Mental status:  Appearance calm and cooperative , adequate hygiene and grooming and good eye contact    Mood depressed and anxious   Affect affect was tearful and affect appropriate    Speech a normal rate   Thought Processes coherent/organized and normal thought processes   Hallucinations no hallucinations present    Thought Content no delusions   Abnormal Thoughts death wish   Orientation  oriented to person and place and time   Remote Memory short term memory intact and long term memory intact   Attention Span concentration impaired   Intellect Appears to be of Average Intelligence   Fund of Knowledge displays adequate knowledge of current events, adequate fund of knowledge regarding past history and adequate fund of knowledge regarding vocabulary    Insight Insight intact   Judgement judgment was intact   Muscle Strength Muscle strength and tone were normal and Normal gait    Language no difficulty naming common objects, no difficulty repeating a phrase  and no difficulty writing a sentence    Pain none   Pain Scale 0         Laboratory Results: No results found for this or any previous visit  Lab Results   Component Value Date    WBC 6 81 08/15/2022    HGB 14 6 08/15/2022    HCT 42 0 08/15/2022    MCV 95 08/15/2022     08/15/2022     Lab Results   Component Value Date     07/14/2015    SODIUM 137 08/15/2022    K 3 6 08/15/2022     08/15/2022    CO2 28 08/15/2022    ANIONGAP 11 07/14/2015    AGAP 7 08/15/2022    BUN 18 08/15/2022    CREATININE 0 78 08/15/2022    GLUC 66 08/15/2022    CALCIUM 9 0 08/15/2022    AST 16 08/15/2022    ALT 16 08/15/2022    ALKPHOS 45 08/15/2022    PROT 7 5 07/14/2015    TP 7 1 08/15/2022    BILITOT 0 4 07/14/2015    TBILI 0 37 08/15/2022    EGFR 94 08/15/2022         Assessment/Plan:      Diagnoses and all orders for this visit:    Generalized anxiety disorder  -     escitalopram (Lexapro) 10 mg tablet;  Take 1 tablet (10 mg total) by mouth daily    Major depressive disorder, recurrent severe without psychotic features ([facility] )          Treatment Recommendations- Risks Benefits         Immediate Medical/Psychiatric/Psychotherapy Treatments and Any Precautions:     Admit to [facility], medication management and group therapy     Start Lexapro 10 MG PO Daily   Continue Wellbutrin  MG PO Daily      Risks, Benefits And Possible Side Effects Of Medications:  Risks, benefits, and possible side effects of medications explained to patient and patient verbalizes understanding    Controlled Medication Discussion: No active record       [facility] Physician's Orders     Admit to: Partial Hospitalization, 5 x per week, for 15 days  Vital signs routine  Diet regular  Group Psychotherapy 9 x per week  Allied Therapy Group 6 x per week  Diagnosis:   1  Generalized anxiety disorder  escitalopram (Lexapro) 10 mg tablet   2   Major depressive disorder, recurrent severe without psychotic features ([facility] )       Medications:   Current Outpatient Medications:   •  buPROPion (WELLBUTRIN XL) 300 mg 24 hr tablet, Take 300 mg by mouth in the morning, Disp: , Rfl:   •  escitalopram (Lexapro) 10 mg tablet, Take 1 tablet (10 mg total) by mouth daily, Disp: 30 tablet, Rfl: 0  •  multivitamin (THERAGRAN) TABS, Take 1 tablet by mouth daily, Disp: , Rfl:   •  rizatriptan (MAXALT) 10 mg tablet, Take 1 tablet by mouth if needed for migraine, Disp: , Rfl:     “I certify that the continuation of Partial Hospitalization services is medically necessary to improve and/or maintain the patient’s condition and functional level, and to prevent relapse or hospitalization, and that this could not be done at a less intensive level of care ”       Brittnee Corley

## 2022-10-11 NOTE — BH TREATMENT PLAN
Assessment/Plan:       Diagnoses and all orders for this visit:     Diagnoses and all orders for this visit:    Generalized anxiety disorder    Severe episode of recurrent major depressive disorder, without psychotic features ([facility] )           Subjective:      Patient ID: Blossom Silverman  is a 43 y o  female   [facility] Treatment Plan   AREAS OF NEED: Mauricio Cortes has been struggling with depression and anxiety (characterized by passive thoughts of dying, panic attacks, feelings of being overwhelmed) as evidenced by her inability to work/chronic pain without physical cause/rapid weight loss due to her son's very recent passing  Date Initiated: 10/11/22     Strengths: Empathetic  Doesn't get angry easily  Very Kind  LONG TERM GOAL:   Date Initiated: 10/11/22  1 0 While at [facility], I will gain the support/education/insights/skills/tools needed to increase my quality of life  Target Date: 11/08/22  Completion Date:         SHORT TERM OBJECTIVES:      Date Initiated: 10/11/22  1 1 I will learn and practice at least 3 new coping skills/tools to deal with anxiety in healthy ways  Revision Date:   Target Date: 10/21/22  Completion Date:      Date Initiated: 10/11/22  1 2 I will set aside at least 3 hours per week to do something I enjoy (not work related)  Revision Date:   Target Date: 10/21/22  Completion Date:     Date Initiated: 10/11/22  1 3 I will take medications as prescribed and share questions and concerns if arise  Revision Date:   Target Date: 10/21/22  Completion Date:      Date Initiated: 10/11/22  1 4 I will identify 3 ways my supports can assist in my recovery and agree to use them when/if needed      Revision Date:   Target Date: 10/21/22  Completion Date:            7 DAY REVISION:     Date Initiated:   1 5   Revision Date:   Target Date:  Completion Date:           PSYCHIATRY:  Date Initiated: 10/11/22  Medication Management and Education       Revision Date:       The person(s) responsible for carrying out the plan is Celia Zelaya MD     NURSING/SYMPTOM EDUCATION:  Date Initiated: 10/11/22      1 1, 1 2  1 3, 1 4 Provide wellness/symptoms and skill education groups three to five days weekly to educate Catherinejoan Sternre on signs and symptoms of diagnoses, skills to manage stressors, and medication questions that will be addressed by the treatment team         Revision date: The person(s) responsible for carrying out the plan is Dania Ferrara MS     PSYCHOLOGY:   Date Initiated: 10/11/22       1 1, 1 2, 1 4 Provide psychotherapy group 5 times per week to allow opportunity for Barbarajulio Dos Santos  to explore stressors and ways of coping  Revision Date:      The person(s) responsible for carrying out the plan is Kulwinder Tompkins MS     ALLIED THERAPY:   Date Initiated: 10/11/22  1 1,1 2 Engage Christie Dos Santos in AT group 5 times daily to encourage development and use of wellness tools to decrease symptoms and promote recovery through meaningful activity  Revision Date:          The person(s) responsible for carrying out the plan is YONG CooperBC      CASE MANAGEMENT:   Date Initiated: 10/11/22      1 0 This  will meet with Christie Dos Santos  3-4 times weekly to assess treatment progress, discharge planning, connection to community supports and UR as indicated  Revision Date:    The person(s) responsible for carrying out the plan is Dania Ferrara MS     TREATMENT REVIEW/COMMENTS:      DISCHARGE CRITERIA: Identify 3 signs of progress and complete relapse prevention plan  DISCHARGE PLAN: Connect with identified outpatient providers  Estimated Length of Stay: 10 treatment days          Diagnosis and Treatment Plan explained to   Christie Soriano Levon  relates understanding diagnosis and is agreeable to Treatment Plan  CLIENT COMMENTS / Please share your thoughts, feelings, need and/or experiences regarding your treatment plan with Staff  Please see follow up note with comments  Signatures can be found on [facility] Treatment plan consent form

## 2022-10-11 NOTE — PSYCH
Subjective:     Patient ID: Christie Dos Santos is a 43 y o  female  [facility] Clinical Progress Notes      Specialized Services Documentation  Therapist must complete separate progress note for each specific clinical activity in which the individual participated during the day  Other MA precert packet will be completed and faxed today  Case Management Note    Dania Ferrara MS    Current suicide risk : Low     [id number] INTAKE  This writer met with Christie Dos Santos to review program expectations/schedule, fill out and sign ROIs, and acquire the standard intake information  Medications changes/added/denied? No    Treatment session number: 0    Individual Case Management Visit provided today?  Yes     [facility] follow up physician's orders: ADMIT TO [facility] 10/12/22

## 2022-10-11 NOTE — PSYCH
Assessment/Plan:      Diagnoses and all orders for this visit:    Diagnoses and all orders for this visit:    Generalized anxiety disorder    Severe episode of recurrent major depressive disorder, without psychotic features ([facility] [address] )            Subjective:     Patient ID: Riley Dalton is a 43 y o  Female       HPI:     Pre-morbid level of function and History of Present Illness: As per Dr Tea Morton: Riley Dalton is a 43 y o  female with depression,  anxiety, migraine and hand dermatitis referred by her primary physician because  has been feeling more depressed and anxious, poor appetite with weight loss  Onset of symptoms was a few months ago with rapidly worsening course since that time  Psychosocial Stressors:Her son passed away 3 months ago   She states that has been suffering from depression for 5 years after her divorce but this became worse after her oldest son passed on 7/9/22  He was using opioids , went to rehab but appear that after he came back from rehab started using again and passed 2 weeks after  He was in his father house when his father found him  She states her anxiety has became worse, she wake up with panic episodes, she went to the ER once with chest pain that appear was related to severe anxiety  She states worries about her youngest daughter who is in high school and at that age was when her son started to use drugs, the child is going to therapy  At this time is only interested in parenting but has anhedonia, is feeling distractive, has sleep disturbances, no appetite  She states that she and her ex- are good friend, she has a boyfriend who lives with her  She has some support system from a group that she goes  [place] Jimy feels depressed, cries during the interview, very anxious, has death wishes , but denies any plan or intent  She feels hopeless, decreased concentration, and forgetful  She denies any psychotic symptoms, nor manic episodes  Her PHQ-9 is 17       As per this writer: Damion Noonan is a 43year old woman who recently lost her 23year old son to an overdose (Fentanyl in July 2022)  She is struggling with anxiety and depression  Jennifer Olivo reports having panic attacks, extreme/chronic pain, has lost 20 pounds in 2 months due to having no appetite, poor focus, passive suicidal thoughts (wishes she was dead), and 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  Jennifer Olivo states that her issues began a year prior to his death  For the entire year, Jennifer Olivo struggled with depression and anxiety due to her son's addiction/her attempting to fix him  She put all of her time/energy into her son and, eventually, got him into rehab  Her son was discharged after 3 weeks and immediately began using again  He ended up overdosing and passing away at his father's house (Anette's ex  lives right down the street) and the father found him  This event has made Anette's anxiety/depression symptoms grow in intensity  She feels as if it's consistently getting worse and she has yet to speak to a therapist  Due to her son's birthday passing a week ago and the holidays approaching, Jennifer Olivo feels as though things are going to get much worse  Reason for evaluation and partial hospitalization as an alternative to inpatient hospitalization: [place] symptoms/statements do not meet medical necessity for inpatient  Previous Psychiatric/psychological treatment/year: Jennifer Olivo had treatment for alcohol in 2019  She was also hospitalized when she was 13years old for anxiety and depression    Current Psychiatrist/Therapist: none  Outpatient and/or Partial and Other [employer] Used (CTT, ICM, VNA): none      Problem Assessment:     SOCIAL/VOCATION:  Family Constellation (include parents, relationship with each and pertinent Psych/Medical History):     Family History   Problem Relation Age of Onset   • Stroke Mother 54        Cause unknown   • Congenital heart disease Mother    • Valvular heart disease Mother Mitral Valve   • Hypertension Father    • Diabetes type II Father    • Stroke Brother 32        Cause unknown   • Depression Brother    • Anxiety disorder Brother    • Schizophrenia Maternal Uncle    • Stroke Maternal Grandfather    • No Known Problems Daughter    • No Known Problems Daughter    • Drug abuse Son         Oxycodone, Percocet, Fentanyl        Mother: na  Spouse: Boyfriend, lives with her and her daughters, is a support   Father: christoph   Children: 13 & 24 yo daughters live with June White and are supports   Sibling: na   Sibling: na   Children: 22 yo son had fatal overdose in July 2022, fentanyl   Other: ex  is a support and lives down the street  Who is the person you relate to best Ex   She lives with her boyfriend and 2 daughters  Legal Guardian (for individuals under 18): na  Family Factors impacting discharge planning (for individuals under 18): na    Domestic Violence: No past history of domestic violence    Additional Comments related to family/relationships/peer support: June White does not accept support from others  She keeps her problems/feelings a secret  School or Work History (strengths/limitations/needs): Cannot work her [employer] job due to symptoms/focus, however, 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  Her highest grade level achieved was high school grad     history includes none    Financial status includes Struggling but has an income    LEISURE ASSESSMENT (Include past and present hobbies/interests and level of involvement (Ex: Group/Club Affiliations): June White used to enjoy creating via sewing/macrame/embroidery and is currently very into reselling clothes  Her primary language is [place]  Preferred language is [place]  Ethnic considerations are none  Religions affiliations and level of involvement none and stated that Mormonism concepts bother her when in reference to her son       FUNCTIONAL STATUS: There has been a recent change in the patient's ability to do the following: does not need [place] service    Level of Assistance Needed/By Whom?: none    Giovany Justin  learns best by  reading, listening, demonstration and picture    SUBSTANCE ABUSE ASSESSMENT: past substance abuse    Do you currently smoke? Yes Offered smoking cessation? Yes     Substance/Route/Age/Amount/Frequency/Last Use: vaping nicotine, daily medical marijuana, occasional caffeine     DETOX HISTORY: none    Previous detox/rehab treatment: [facility] in 2019    HEALTH ASSESSMENT: has lost 10 lbs or more in the last 6 months without trying and has had decreased appetite for 5 days or more    Primary Care Physician:   DO Raven Joseph [address]  [id number]    If None on file providers offered:Yes  Date of Last Physical: October 2022  if not within the last year, one has been recommended:Yes    NUTRITION SCREENING:  Do you have any food allergies: No   Allergies   Allergen Reactions   • Contrast [Iodinated Diagnostic Agents] Itching     Throat Itching       Weight loss or gain of 10 pounds or more in the last 3 months: Yes  Decrease in appetite and/or food intake: Yes  Dental issues impacting nutrition: No  Binging or restricting patterns: No  Past treatment for an eating disorder: No  Level of nutrition needs: Yes = 1 point;  No = 0   2  none (0)- low (1-3) - moderate (4) - severe (5)   Action plan if moderate to severe: Referral to:NA      LEGAL: No Mental Health Advance Directive or Power of  on file    Risk Assessment:   The following ratings are based on my interview(s) with Lety Boland    Risk of Harm to Self:   Demographic risk factors include , lowest socioeconomic class and  status  Historical Risk Factors include substance abuse or dependence  Recent Specific Risk Factors include passive death wishes, feelings of guilt or self blame and diagnosis of depression     Risk of Harm to Others:   Demographic Risk Factors include none  Historical Risk Factors include none  Recent Specific Risk Factors include multiple stressors    Access to Weapons:   Sofía Leon  has access to the following weapons: none   The following steps have been taken to ensure weapons are properly secured: na    Based on the above information, the client presents the following risk of harm to self or others:  low    The following interventions are recommended:   no intervention changes    Notes regarding this Risk Assessment: Low risk to self, none to others    Review Of Systems:     Mood Anxiety and Depression   Behavior Normal    Thought Content Normal   General Sleep Disturbances and Decreased Functioning   Personality Normal   Other Psych Symptoms Normal   Constitutional Negative   ENT Negative   Cardiovascular Negative   Respiratory Negative   Gastrointestinal Negative   Genitourinary Negative   Musculoskeletal Negative   Integumentary Negative   Neurological Negative   Endocrine Normal    Other Symptoms Normal         Mental status:  Appearance calm and cooperative , adequate hygiene and grooming and good eye contact    Mood depressed and anxious   Affect affect was tearful and affect appropriate    Speech a normal rate   Thought Processes coherent/organized and normal thought processes   Hallucinations no hallucinations present    Thought Content no delusions   Abnormal Thoughts death wish   Orientation  oriented to person and place and time   Remote Memory short term memory intact and long term memory intact   Attention Span concentration impaired   Intellect Appears to be of Average Intelligence   Fund of Knowledge displays adequate knowledge of current events, adequate fund of knowledge regarding past history and adequate fund of knowledge regarding vocabulary    Insight Insight intact   Judgement judgment was intact   Muscle Strength Muscle strength and tone were normal and Normal gait    Language no difficulty naming common objects, no difficulty repeating a phrase  and no difficulty writing a sentence    Pain none   Pain Scale 0        DSM:     1  Generalized anxiety disorder     2  Severe episode of recurrent major depressive disorder, without psychotic features ([facility] [address] )          Plan: [facility]    Anticipated aftercare plan: Continue with new outpatient therapist and psychiatrist, recommended follow up with grief support group(s)

## 2022-10-12 ENCOUNTER — OFFICE VISIT (OUTPATIENT)
Dept: PSYCHOLOGY | Facility: CLINIC | Age: 42
End: 2022-10-12
Payer: COMMERCIAL

## 2022-10-12 DIAGNOSIS — F41.1 GENERALIZED ANXIETY DISORDER: ICD-10-CM

## 2022-10-12 DIAGNOSIS — F33.2 SEVERE EPISODE OF RECURRENT MAJOR DEPRESSIVE DISORDER, WITHOUT PSYCHOTIC FEATURES (HCC): Primary | ICD-10-CM

## 2022-10-12 PROCEDURE — H0035 MH PARTIAL HOSP TX UNDER 24H: HCPCS

## 2022-10-12 NOTE — PSYCH
Subjective:    Patient ID: Suzette Romano is a 43 y o  female      [facility] Clinical Progress Notes      Specialized Services Documentation  Therapist must complete separate progress note for each specific clinical activity in which the individual participated during the day  Education Therapy   [id number]  Suzette Romano actively shared in check in and goal review  Presented as Receptive related to readiness to learn  Suzette Romano observed goal review only due to first day in programming  did not present with any barriers to learning  [id number]  Suzette Romano engaged throughout the treatment day  Was engaged in learning related to Illness, Medication, Aftercare and Wellness Tools  Staff utilized Verbal, Written, A/V and Demonstration teaching methods  Suzette Romano shared area of learning and set a goal for outside of program to shop for gifts for her daughter's birthday  Tx Plan Objective: 1 1, 1 2, 1 4, Therapist: Vandana Mcclure    Group Psychotherapy    [id number] The group played the Dialectical Behavioral Therapy game called "The Game of Real Life" by Michael Okeefe  The game presents the group with complex scenarios called "Conflict Cards"  Participants were given several "Skill Cards" to choose from in order to best react to the given conflict situation  These "Skill Cards" contain the names of DBT skills, along with a detailed explanation of the particular skill  The "Sauloe Pel Mind", the player who decides a personal favorite skill card, (this alternates) are given the other members' selected skill cards to read aloud  Each skill being announced prompts discussion regarding how that skill can be appropriately used  This game promoted significant discussion regarding DBT skills, multiple questions/answers about DBT acronyms, and a lot of group comradery  Suzette Romano actively participated in the game, took part in each discussion, and learned about multiple new DBT skills   Suzette hussein making progress towards goals  Continue w/ PHP  Tx Plan Objective: 1 1,1 2, 1 4   Therapist: ROMERO Whiting

## 2022-10-12 NOTE — PSYCH
Subjective:     Patient ID: Harshad Sheridan is a 43 y o  female  [facility] Clinical Progress Notes      Specialized Services Documentation  Therapist must complete separate progress note for each specific clinical activity in which the individual participated during the day  Psychotherapeutic Group ([id number])    Today was a follow up group to yesterday's Cognitive Distortions Group, as we had much more to speak about regarding distortions and how to reframe them  The group learned about all 15 cognitive distortions  They gained a further understanding about the way they think irrationally and discovered ways to address those instincts  The group was provided a worksheet with all 15 cognitive distortions and their definitions  Media in the form of a YouTube video regarding 10 common cognitive distortions (by Pricilla) was played and discussed in depth  The video was an excellent short talk on cognitive distortions and we then discussed multiple other media sources for wellness/recovery  Additionally, the group was given several handouts on reframing distortions and we covered multiple ways to lessen distortions and reframe them once identified  Harshad Sheridan was an active participant in this group via taking notes, active listening, and asking clarifying questions/giving examples  Abigail Cordero is actively working on goals  Continue w/ PHP  Tx Plan Objective: 1 1,1 2, 1 4   Therapist: Marciano Salgado MS    Psychotherapy Group ([id number])  STEPHANIE Wheeler shared her life story as she co-led this session  The guest speaker presentation about CAMMIE encouraged group members to reflect on their mental health journey  regarding the power of learning about self, accepting illness, and personal responsibility in recovery   STEPHANIE Silva reviewed community resources in addition to personal resources like positive affirmations and an acronym she uses to remind her about what tools she should use on a daily basis to maintain mental health wellness  Riley Dalton  attentively listened to Shira's presentation  Progress toward goals noted  Continue psychotherapy groups to encourage further exploration of needs, personal awareness, and skills        Tx Plan Objective: 1 1, 1 2, 1 4 Therapist: Norah Carrasco MS      Case Management Note    Norah Carrasco MS    Current suicide risk : Low     Did not have time to meet today    Medications changes/added/denied? No    Treatment session number: 1    Individual Case Management Visit provided today?  No    [facility] follow up physician's orders: none

## 2022-10-13 ENCOUNTER — APPOINTMENT (OUTPATIENT)
Dept: PSYCHOLOGY | Facility: CLINIC | Age: 42
End: 2022-10-13

## 2022-10-13 ENCOUNTER — DOCUMENTATION (OUTPATIENT)
Dept: PSYCHOLOGY | Facility: CLINIC | Age: 42
End: 2022-10-13

## 2022-10-14 ENCOUNTER — DOCUMENTATION (OUTPATIENT)
Dept: PSYCHOLOGY | Facility: CLINIC | Age: 42
End: 2022-10-14

## 2022-10-14 ENCOUNTER — APPOINTMENT (OUTPATIENT)
Dept: PSYCHOLOGY | Facility: CLINIC | Age: 42
End: 2022-10-14

## 2022-10-14 NOTE — PROGRESS NOTES
Subjective:     Patient ID: Tatyana Corado is a 43 y o  female  [facility] Clinical Progress Notes      Specialized Services Documentation  Therapist must complete separate progress note for each specific clinical activity in which the individual participated during the day  Case Management Note    [place] MS Desi    Current suicide risk : Low     Called out today for being sick    Medications changes/added/denied? No    Treatment session number: 2    Individual Case Management Visit provided today?  No    [facility] follow up physician's orders: none

## 2022-10-14 NOTE — PROGRESS NOTES
Subjective:     Patient ID: Suzette Romano is a 43 y o  female  [facility] Clinical Progress Notes      Specialized Services Documentation  Therapist must complete separate progress note for each specific clinical activity in which the individual participated during the day  Case Management Note    Raheem Ball MS    Current suicide risk : Low     Called out today for being sick    Medications changes/added/denied? No    Treatment session number: 2    Individual Case Management Visit provided today?  No    [facility] follow up physician's orders: none

## 2022-10-17 ENCOUNTER — APPOINTMENT (OUTPATIENT)
Dept: PSYCHOLOGY | Facility: CLINIC | Age: 42
End: 2022-10-17

## 2022-10-17 ENCOUNTER — DOCUMENTATION (OUTPATIENT)
Dept: PSYCHOLOGY | Facility: CLINIC | Age: 42
End: 2022-10-17

## 2022-10-17 NOTE — PROGRESS NOTES
Subjective:     Patient ID: Lawanda Clinton is a 43 y o  female  [facility] Clinical Progress Notes      Specialized Services Documentation  Therapist must complete separate progress note for each specific clinical activity in which the individual participated during the day  Other DISCHARGE TODAY 10/17/22, Non-routine - As per patient's request  -Dr Robina Corley    Case Management Note    Paulina Maxwell MS    Current suicide risk : Jonathan Clark called [facility] () this morning and told him that she no longer wishes to attend program and would like to be discharged  She stated she would rather pursue individual therapy  [phone] attempted to call Cinthia Clark and left VM instead  Notified her I got the message and was discharging but also was offering to give some direction to finding alternate providers/support groups  Requested a call back if she wanted any assistance  Initial Treatment Plan was never signed by Cinthia Clark due to her lack of attendance  Medications changes/added/denied? No    Treatment session number: 1    Individual Case Management Visit provided today?  No    [facility] follow up physician's orders: DISCHARGE TODAY 10/17/22

## 2022-10-17 NOTE — PROGRESS NOTES
Subjective:     Patient ID: Riley Dalton is a 43 y o  female  [facility] Discharge Summary:   Admission Date: 10/12/22  Patient was referred by Dr Mekhi Turner (PCP)  Discharge Date: 10/17/22  Was this a routine discharge? No, discharge was done as per patient's request  She attended 1 day of Tx  Diagnosis: Axis I:   Generalized anxiety disorder     Severe episode of recurrent major depressive disorder, without psychotic features [facility])  Treating Physician: Dr Valencia Corley  Treatment Complications: Radha Babcock only attended 1 day of program, then missed 2 days before calling to request discharge  Presenting Need: as of 10/11/22: As per this writer: Lorena Richards is a 43year old woman who recently lost her 23year old son to an overdose (Fentanyl in July 2022)  She is struggling with anxiety and depression  Radha Babcock reports having panic attacks, extreme/chronic pain, has lost 20 pounds in 2 months due to having no appetite, poor focus, passive suicidal thoughts (wishes she was dead), and cannot currently work her [employer] job  Radha Babcock states that her issues began a year prior to his death  For the entire year, Radha Babcock struggled with depression and anxiety due to her son's addiction/her attempting to fix him  She put all of her time/energy into her son and, eventually, got him into rehab  Her son was discharged after 3 weeks and immediately began using again  He ended up overdosing and passing away at his father's house (Anette's ex  lives right down the street) and the father found him  This event has made Anette's anxiety/depression symptoms grow in intensity  She feels as if it's consistently getting worse and she has yet to speak to a therapist  Due to her son's birthday passing a week ago and the holidays approaching, Radha Babcock feels as though things are going to get much worse     Course of treatment includes:    group counseling, medication management, individual case management, allied therapy, psychoeducation and psychiatric evaluation  Treatment Progress: Progress could not be assessed  Aftercare recommendations include: Follow up with outpatient therapy, outpatient medication management, and grief support groups  Betty Rahman was called on 10/17/22 and a voicemail was left offering assistance in finding these providers  No call was returned at the time of writing this report     Discharge Medications include:  Current Outpatient Medications:   •  buPROPion (WELLBUTRIN XL) 300 mg 24 hr tablet, Take 300 mg by mouth in the morning, Disp: , Rfl:   •  escitalopram (Lexapro) 10 mg tablet, Take 1 tablet (10 mg total) by mouth daily, Disp: 30 tablet, Rfl: 0  •  multivitamin (THERAGRAN) TABS, Take 1 tablet by mouth daily, Disp: , Rfl:   •  rizatriptan (MAXALT) 10 mg tablet, Take 1 tablet by mouth if needed for migraine, Disp: , Rfl:

## 2022-10-18 ENCOUNTER — APPOINTMENT (OUTPATIENT)
Dept: PSYCHOLOGY | Facility: CLINIC | Age: 42
End: 2022-10-18

## 2022-10-18 ENCOUNTER — LAB (OUTPATIENT)
Dept: LAB | Facility: CLINIC | Age: 42
End: 2022-10-18
Payer: MEDICARE

## 2022-10-18 DIAGNOSIS — I10 ESSENTIAL HYPERTENSION: ICD-10-CM

## 2022-10-18 DIAGNOSIS — Z13.1 SCREENING FOR DIABETES MELLITUS: ICD-10-CM

## 2022-10-18 DIAGNOSIS — F33.2 SEVERE EPISODE OF RECURRENT MAJOR DEPRESSIVE DISORDER, WITHOUT PSYCHOTIC FEATURES (HCC): ICD-10-CM

## 2022-10-18 DIAGNOSIS — F41.9 ANXIETY: ICD-10-CM

## 2022-10-18 DIAGNOSIS — Z13.6 SCREENING FOR CARDIOVASCULAR CONDITION: ICD-10-CM

## 2022-10-18 DIAGNOSIS — Z11.4 SCREENING FOR HIV (HUMAN IMMUNODEFICIENCY VIRUS): ICD-10-CM

## 2022-10-18 DIAGNOSIS — E55.9 VITAMIN D DEFICIENCY: ICD-10-CM

## 2022-10-18 DIAGNOSIS — R68.89 POOR MOTIVATION: ICD-10-CM

## 2022-10-18 DIAGNOSIS — Z11.59 NEED FOR HEPATITIS C SCREENING TEST: ICD-10-CM

## 2022-10-18 DIAGNOSIS — R63.0 DECREASED APPETITE: ICD-10-CM

## 2022-10-18 DIAGNOSIS — G43.009 MIGRAINE WITHOUT AURA AND WITHOUT STATUS MIGRAINOSUS, NOT INTRACTABLE: ICD-10-CM

## 2022-10-18 LAB
25(OH)D3 SERPL-MCNC: 36 NG/ML (ref 30–100)
ALBUMIN SERPL BCP-MCNC: 4.2 G/DL (ref 3.5–5)
ALP SERPL-CCNC: 51 U/L (ref 34–104)
ALT SERPL W P-5'-P-CCNC: 14 U/L (ref 7–52)
ANION GAP SERPL CALCULATED.3IONS-SCNC: 4 MMOL/L (ref 4–13)
AST SERPL W P-5'-P-CCNC: 15 U/L (ref 13–39)
BASOPHILS # BLD AUTO: 0.03 THOUSANDS/ΜL (ref 0–0.1)
BASOPHILS NFR BLD AUTO: 1 % (ref 0–1)
BILIRUB SERPL-MCNC: 0.47 MG/DL (ref 0.2–1)
BUN SERPL-MCNC: 14 MG/DL (ref 5–25)
CALCIUM SERPL-MCNC: 9.3 MG/DL (ref 8.4–10.2)
CHLORIDE SERPL-SCNC: 103 MMOL/L (ref 96–108)
CHOLEST SERPL-MCNC: 126 MG/DL
CO2 SERPL-SCNC: 29 MMOL/L (ref 21–32)
CREAT SERPL-MCNC: 0.69 MG/DL (ref 0.6–1.3)
EOSINOPHIL # BLD AUTO: 0.12 THOUSAND/ΜL (ref 0–0.61)
EOSINOPHIL NFR BLD AUTO: 2 % (ref 0–6)
ERYTHROCYTE [DISTWIDTH] IN BLOOD BY AUTOMATED COUNT: 12.2 % (ref 11.6–15.1)
EST. AVERAGE GLUCOSE BLD GHB EST-MCNC: 100 MG/DL
GFR SERPL CREATININE-BSD FRML MDRD: 107 ML/MIN/1.73SQ M
GLUCOSE P FAST SERPL-MCNC: 91 MG/DL (ref 65–99)
HBA1C MFR BLD: 5.1 %
HCT VFR BLD AUTO: 40.4 % (ref 34.8–46.1)
HCV AB SER QL: NORMAL
HDLC SERPL-MCNC: 59 MG/DL
HGB BLD-MCNC: 13.6 G/DL (ref 11.5–15.4)
IMM GRANULOCYTES # BLD AUTO: 0.03 THOUSAND/UL (ref 0–0.2)
IMM GRANULOCYTES NFR BLD AUTO: 1 % (ref 0–2)
LDLC SERPL CALC-MCNC: 50 MG/DL (ref 0–100)
LDLC SERPL DIRECT ASSAY-MCNC: 49 MG/DL (ref 0–100)
LYMPHOCYTES # BLD AUTO: 1.48 THOUSANDS/ΜL (ref 0.6–4.47)
LYMPHOCYTES NFR BLD AUTO: 23 % (ref 14–44)
MAGNESIUM SERPL-MCNC: 2 MG/DL (ref 1.9–2.7)
MCH RBC QN AUTO: 31.8 PG (ref 26.8–34.3)
MCHC RBC AUTO-ENTMCNC: 33.7 G/DL (ref 31.4–37.4)
MCV RBC AUTO: 94 FL (ref 82–98)
MONOCYTES # BLD AUTO: 0.69 THOUSAND/ΜL (ref 0.17–1.22)
MONOCYTES NFR BLD AUTO: 11 % (ref 4–12)
NEUTROPHILS # BLD AUTO: 4.23 THOUSANDS/ΜL (ref 1.85–7.62)
NEUTS SEG NFR BLD AUTO: 62 % (ref 43–75)
NONHDLC SERPL-MCNC: 67 MG/DL
NRBC BLD AUTO-RTO: 0 /100 WBCS
PLATELET # BLD AUTO: 221 THOUSANDS/UL (ref 149–390)
PMV BLD AUTO: 9.7 FL (ref 8.9–12.7)
POTASSIUM SERPL-SCNC: 3.8 MMOL/L (ref 3.5–5.3)
PROT SERPL-MCNC: 7.2 G/DL (ref 6.4–8.4)
RBC # BLD AUTO: 4.28 MILLION/UL (ref 3.81–5.12)
SODIUM SERPL-SCNC: 136 MMOL/L (ref 135–147)
TRIGL SERPL-MCNC: 86 MG/DL
TSH SERPL DL<=0.05 MIU/L-ACNC: 1.59 UIU/ML (ref 0.45–4.5)
WBC # BLD AUTO: 6.58 THOUSAND/UL (ref 4.31–10.16)

## 2022-10-18 PROCEDURE — 84443 ASSAY THYROID STIM HORMONE: CPT

## 2022-10-18 PROCEDURE — 82306 VITAMIN D 25 HYDROXY: CPT

## 2022-10-18 PROCEDURE — 87389 HIV-1 AG W/HIV-1&-2 AB AG IA: CPT

## 2022-10-18 PROCEDURE — 80061 LIPID PANEL: CPT

## 2022-10-18 PROCEDURE — 36415 COLL VENOUS BLD VENIPUNCTURE: CPT

## 2022-10-18 PROCEDURE — 83735 ASSAY OF MAGNESIUM: CPT

## 2022-10-18 PROCEDURE — 86803 HEPATITIS C AB TEST: CPT

## 2022-10-18 PROCEDURE — 83721 ASSAY OF BLOOD LIPOPROTEIN: CPT

## 2022-10-18 PROCEDURE — 85025 COMPLETE CBC W/AUTO DIFF WBC: CPT

## 2022-10-18 PROCEDURE — 83036 HEMOGLOBIN GLYCOSYLATED A1C: CPT

## 2022-10-18 PROCEDURE — 80053 COMPREHEN METABOLIC PANEL: CPT

## 2022-10-19 ENCOUNTER — APPOINTMENT (OUTPATIENT)
Dept: PSYCHOLOGY | Facility: CLINIC | Age: 42
End: 2022-10-19

## 2022-10-19 LAB — HIV 1+2 AB+HIV1 P24 AG SERPL QL IA: NORMAL

## 2022-10-20 ENCOUNTER — APPOINTMENT (OUTPATIENT)
Dept: PSYCHOLOGY | Facility: CLINIC | Age: 42
End: 2022-10-20

## 2022-10-21 ENCOUNTER — APPOINTMENT (OUTPATIENT)
Dept: PSYCHOLOGY | Facility: CLINIC | Age: 42
End: 2022-10-21

## 2022-10-26 ENCOUNTER — DOCUMENTATION (OUTPATIENT)
Dept: PSYCHOLOGY | Facility: CLINIC | Age: 42
End: 2022-10-26

## 2022-10-26 NOTE — PROGRESS NOTES
Subjective:     Patient ID: Don Multani is a 43 y o  female  [facility] Clinical Progress Notes      Specialized Services Documentation  Therapist must complete separate progress note for each specific clinical activity in which the individual participated during the day  Other Due to Anette's unanticipated discharge from program, a signature was unable to be acquired for her treatment plan  However, the treatment plan was reviewed and a copy made available on [employer]

## 2023-02-01 ENCOUNTER — TELEPHONE (OUTPATIENT)
Dept: FAMILY MEDICINE CLINIC | Facility: CLINIC | Age: 43
End: 2023-02-01

## 2023-02-01 DIAGNOSIS — G43.009 MIGRAINE WITHOUT AURA AND WITHOUT STATUS MIGRAINOSUS, NOT INTRACTABLE: Primary | ICD-10-CM

## 2023-02-01 NOTE — TELEPHONE ENCOUNTER
Patient called in, in regards to her migraine medicine  She needs a refill for the Rizatriptan 10 mg  This script has not been ordered from the provider  It was ordered from the previous provider  Please advise

## 2023-02-02 RX ORDER — RIZATRIPTAN BENZOATE 10 MG/1
TABLET ORAL
Qty: 6 TABLET | Refills: 0 | Status: SHIPPED | OUTPATIENT
Start: 2023-02-02

## 2023-02-02 NOTE — TELEPHONE ENCOUNTER
Spoke to patient, patient is aware of medication sent to pharmacy  Left message on machine to schedule an appointment

## 2023-02-02 NOTE — TELEPHONE ENCOUNTER
Patient is overdue for appt  Clerical please schedule:    Return in about 6 weeks (around 11/15/2022) for 40min  F/U Anx/Dep, Migraine, Vit D Def, ECig, Labs; PRN Partial Psych D/C   #6 sent, no refills s appt  Labs done 10/18/22

## 2023-02-18 ENCOUNTER — HOSPITAL ENCOUNTER (OUTPATIENT)
Dept: MAMMOGRAPHY | Facility: HOSPITAL | Age: 43
Discharge: HOME | End: 2023-02-18

## 2023-02-18 VITALS — BODY MASS INDEX: 19.33 KG/M2 | HEIGHT: 65 IN | WEIGHT: 116 LBS

## 2023-02-18 DIAGNOSIS — Z12.31 ENCOUNTER FOR SCREENING MAMMOGRAM FOR BREAST CANCER: ICD-10-CM

## 2023-02-22 NOTE — RESULT ENCOUNTER NOTE
Normal mammogram   Advised monthly self-breast exams  Repeat in 1 year      Message sent to patient via [facility] patient portal  Procedure scheduled as Urgent.

## 2023-03-10 DIAGNOSIS — G43.009 MIGRAINE WITHOUT AURA AND WITHOUT STATUS MIGRAINOSUS, NOT INTRACTABLE: ICD-10-CM

## 2023-03-10 RX ORDER — RIZATRIPTAN BENZOATE 10 MG/1
TABLET ORAL
Qty: 6 TABLET | Refills: 0 | Status: SHIPPED | OUTPATIENT
Start: 2023-03-10

## 2023-03-10 NOTE — TELEPHONE ENCOUNTER
Patient is currently out of the medication Rizatriptan 10mg  Patient has an appointment on 3/19  Patient is requesting a courtesy refill for the Rizatriptan 10mg until her appointment  Please advise

## 2023-03-16 NOTE — PROGRESS NOTES
Assessment/Plan:  Problem List Items Addressed This Visit        Cardiovascular and Mediastinum    Migraine     Worsening, likely due to worsening mood  Continue Maxalt 10 mg p r n       For headache and migraine prevention I would suggest a combination vitamin supplement which may include 1 or more of the following ingredients:  Magnesium 400 mg , Riboflavin (vitamin B2) 400 - 600 mg,  Butterbur 150 mg (PA free)  Other ingredients that may be helpful are feverfew, coenzyme Q10 100 mg three times a day,  melatonin and kiley  Some example brands that combine some of these ingredients are Migravent or Dolovent  Relevant Medications    CANNABIDIOL PO    buPROPion (WELLBUTRIN XL) 300 mg 24 hr tablet    escitalopram (Lexapro) 10 mg tablet    Other Relevant Orders    Magnesium       Musculoskeletal and Integument    Hand dermatitis     Stable on Elocon PRN  Other    Anxiety - Primary     Uncontrolled  Start Lexapro 10mg QD  Declines return to partial inpatient Psych  Continue Wellbutrin XL 300mg QD  Smart phone eliz list and counseling list provided today  Relevant Medications    escitalopram (Lexapro) 10 mg tablet    Vitamin D deficiency     Stable on MVI  Relevant Orders    Comprehensive metabolic panel    Depression     Uncontrolled  Start Lexapro 10mg QD  Declines return to partial inpatient Psych  Continue Wellbutrin XL 300mg QD  Smart phone eliz list and counseling list provided today  Relevant Medications    buPROPion (WELLBUTRIN XL) 300 mg 24 hr tablet    escitalopram (Lexapro) 10 mg tablet    Electronic cigarette use     Contemplative  Electronic cigarette smoking cessation advised  History of alcohol abuse     In remission  No longer attending AA  Medical marijuana use     Using for anxiety  Electronic cigarette smoking cessation and instead topical / oral alternatives advised               Return in 6 weeks (on 4/28/2023) for 40min F/U Anx/Dep, Migraine, Vit D Def, ECig, Labs  Future Appointments   Date Time Provider Govind Tavera   2023 10:20 AM Deni Samuel,  [facility]        Subjective:     Venkata Katz is a 43 y o  female who presents today for a follow-up on her chronic medical conditions  HPI:  Chief Complaint   Patient presents with   • Follow-up     F/U Anx/Dep, Migraine, Vit D Def, ECig, Labs  No new problems or concerns  • HM     Declines covid boosters at this time  -- Above per clinical staff and reviewed  --      HPI      Today:    Return in about 6 weeks (around 11/15/2022) for 40min  F/U Anx/Dep, Migraine, Vit D Def, ECig, Labs; PRN Partial Psych D/C        HTN - Improved s/p ETOH cessation   Previously on Metoprolol  BP check outside of office "stable" per patient  Not watching diet  Only drinking 4-6 Ensure protein drinks daily due to lack of appetite since  when son Kristin Chaney entered rehab, then  from drug overdose  No Exercise - previously  Walking for 1 hour, 3 times per week        Depression / Anxiety - s/p Partial Inpatient Psych 10/12/22 - 10/13/22 for 1 day, as she wanted to pursue individual therapy  She never started Lexapro 10mg QD as Rx by Psych because she was anxious about taking Rx  Mood worse since  when son Kristin Chaney entered rehab, then  from drug overdose   Decreased appetite  On Wellbutrin XL 300mg QD  Poor social supports  No SI/HI/AH/VH  Feels safe at home  Unable to find counselor that accepts her insurance  Was able to get on waiting lists  Last counseling   Attends [employer] child loss support group every other week since , which has been helpful  She has been self-isolating by choice as she no longer prefers group settings  Last saw Psychiatrist in early  for anxiety  Previously on Prozac, Zoloft, Paxil - unsure of why Rx was D/C  No higher dose of Wellbutrin previously        PHQ-2/9 Depression Screening    Little interest or pleasure in doing things: 3 - nearly every day  Feeling down, depressed, or hopeless: 3 - nearly every day  Trouble falling or staying asleep, or sleeping too much: 0 - not at all  Feeling tired or having little energy: 0 - not at all  Poor appetite or overeating: 3 - nearly every day  Feeling bad about yourself - or that you are a failure or have let yourself or your family down: 3 - nearly every day  Trouble concentrating on things, such as reading the newspaper or watching television: 2 - more than half the days  Moving or speaking so slowly that other people could have noticed  Or the opposite - being so fidgety or restless that you have been moving around a lot more than usual: 3 - nearly every day  Thoughts that you would be better off dead, or of hurting yourself in some way: 0 - not at all  PHQ-9 Score: 17   PHQ-9 Interpretation: Moderately severe depression          SEN-7 Flowsheet Screening    Flowsheet Row Most Recent Value   Over the last 2 weeks, how often have you been bothered by any of the following problems? Feeling nervous, anxious, or on edge 3   Not being able to stop or control worrying 3   Worrying too much about different things 3   Trouble relaxing 3   Being so restless that it is hard to sit still 3   Becoming easily annoyed or irritable 3   Feeling afraid as if something awful might happen 3   SEN-7 Total Score 21              H/O ETOH Abuse - In remission  No longer attends AA once weekly  No sponsor  Never attended rehab        Migraine s Aura - On Maxalt 10mg PRN  Has HA prior to migraine     Has associated nausea  Occurs 1-2 times per month (previously occurring every other month)  Occurs c menses  Usually only need to take 1 Maxalt per daily x 2- 3 days  She is taking 6 pills per month  Previously on Imitrex - D/C due to chest pain  No Neuro consult or brain imaging previously         Hand Dermatitis - Stable    Using Elocon PRN - every other week         Vitamin D Deficiency - s/p Drisdol  Taking MVI, but not OTC Vitamin D       E- cigarette Smoker & Medial Marijuana - Contemplative  Using Medical Marijuana vape at night           Reviewed:  Labs 10/18/22, Gyn 10/15/20, Chiro 11/28/22     Sees Gyn Ms  Mere Tan PA-C at [address] [facility]  Next appt 12/22 - overdue  The following portions of the patient's history were reviewed and updated as appropriate: allergies, current medications, past family history, past medical history, past social history, past surgical history and problem list       Review of Systems   Constitutional: Positive for appetite change (Decreased)  Negative for chills, diaphoresis, fatigue and fever  Respiratory: Positive for shortness of breath (Due to anxiety and stress)  Negative for chest tightness  Cardiovascular: Negative for chest pain  Gastrointestinal: Negative for abdominal pain, blood in stool, diarrhea, nausea and vomiting  Genitourinary: Negative for dysuria  Current Outpatient Medications   Medication Sig Dispense Refill   • buPROPion (WELLBUTRIN XL) 300 mg 24 hr tablet Take 1 tablet (300 mg total) by mouth in the morning 30 tablet 1   • CANNABIDIOL PO Take by mouth Medical Marijuana Vape nightly for anxiety     • escitalopram (Lexapro) 10 mg tablet Take 1 tablet (10 mg total) by mouth daily 30 tablet 1   • multivitamin (THERAGRAN) TABS Take 1 tablet by mouth daily     • rizatriptan (MAXALT) 10 mg tablet 1 pill by mouth daily as needed for migraine  May repeat 2 hours later x 2 if migraine symptoms persist   Max dose:  30mg/24 hours  6 tablet 0     No current facility-administered medications for this visit         Objective:  /78   Pulse 81   Temp (!) 96 8 °F (36 °C)   Resp 14   Ht 5' 5" (1 651 m)   Wt 52 9 kg (116 lb 9 6 oz)   SpO2 100%   BMI 19 40 kg/m²    Wt Readings from Last 3 Encounters:   03/17/23 52 9 kg (116 lb 9 6 oz)   02/18/23 52 6 kg (116 lb)   10/11/22 52 6 kg (116 lb)      BP Readings from Last 3 Encounters:   03/17/23 124/78   10/11/22 128/85   10/04/22 136/82          Physical Exam  Vitals and nursing note reviewed  Constitutional:       Appearance: Normal appearance  She is well-developed and normal weight  HENT:      Head: Normocephalic and atraumatic  Mouth/Throat:      Mouth: Mucous membranes are moist    Eyes:      Extraocular Movements: Extraocular movements intact  Conjunctiva/sclera: Conjunctivae normal    Neck:      Thyroid: No thyromegaly  Cardiovascular:      Rate and Rhythm: Normal rate and regular rhythm  Pulses: Normal pulses  Heart sounds: Normal heart sounds  Pulmonary:      Effort: Pulmonary effort is normal       Breath sounds: Normal breath sounds  Abdominal:      General: Abdomen is flat  Bowel sounds are normal  There is no distension  Palpations: Abdomen is soft  There is no mass  Tenderness: There is no abdominal tenderness  There is no guarding or rebound  Musculoskeletal:         General: No swelling or tenderness  Cervical back: Neck supple  Right lower leg: No edema  Left lower leg: No edema  Lymphadenopathy:      Cervical: No cervical adenopathy  Skin:     General: Skin is dry  Comments: B/L palmar peeling skin   Neurological:      General: No focal deficit present  Mental Status: She is alert and oriented to person, place, and time  Sensory: No sensory deficit  Motor: No weakness  Coordination: Coordination normal       Gait: Gait normal       Deep Tendon Reflexes: Reflexes normal    Psychiatric:         Attention and Perception: Attention and perception normal          Mood and Affect: Mood is anxious and depressed  Speech: Speech normal          Behavior: Behavior normal  Behavior is cooperative  Thought Content:  Thought content normal          Cognition and Memory: Cognition and memory normal          Judgment: Judgment normal          Lab Results:      Lab Results   Component Value Date    WBC 6 58 10/18/2022    HGB 13 6 10/18/2022    HCT 40 4 10/18/2022     10/18/2022    TRIG 86 10/18/2022    HDL 59 10/18/2022    LDLDIRECT 49 10/18/2022    ALT 14 10/18/2022    AST 15 10/18/2022     07/14/2015    K 3 8 10/18/2022     10/18/2022    CREATININE 0 69 10/18/2022    BUN 14 10/18/2022    CO2 29 10/18/2022    INR 1 03 07/14/2015    GLUF 91 10/18/2022    HGBA1C 5 1 10/18/2022     No results found for: URICACID  Invalid input(s): BASENAME Vitamin D    Mammo screening bilateral w 3d & cad    Result Date: 2/22/2023  Narrative: DIAGNOSIS: Encounter for screening mammogram for breast cancer TECHNIQUE: Digital screening mammography was performed  Computer Aided Detection (CAD) analyzed all applicable images  COMPARISONS: None available  RELEVANT HISTORY: Family Breast Cancer History: History of breast cancer in Neg Hx  Family Medical History: No known relevant family medical history  Personal History: Hormone history includes birth control  No known relevant surgical history  No known relevant medical history  The patient is scheduled in a reminder system for screening mammography  8-10% of cancers will be missed on mammography  Management of a palpable abnormality must be based on clinical grounds  Patients will be notified of their results via letter from our facility  Accredited by [employer] of Radiology and FDA  RISK ASSESSMENT: 5 Year Tyrer-Cuzick: 0 49 % 10 Year Tyrer-Cuzick: 1 21 % Lifetime Tyrer-Cuzick: 8 33 % TISSUE DENSITY: The breasts are heterogeneously dense, which may obscure small masses  INDICATION: Chris Salamanca is a 43 y o  female presenting for screening mammography  FINDINGS: There are no suspicious masses, grouped microcalcifications or areas of architectural distortion  The skin and nipple areolar complex are unremarkable  Impression: No mammographic evidence of malignancy   ASSESSMENT/BI-RADS CATEGORY: Left: 1 - Negative Right: 1 - Negative Overall: 1 - Negative RECOMMENDATION:      - Routine screening mammogram in 1 year for both breasts   Workstation ID: [id number]        POCT Labs

## 2023-03-17 ENCOUNTER — OFFICE VISIT (OUTPATIENT)
Dept: FAMILY MEDICINE CLINIC | Facility: CLINIC | Age: 43
End: 2023-03-17

## 2023-03-17 VITALS
SYSTOLIC BLOOD PRESSURE: 124 MMHG | WEIGHT: 116.6 LBS | BODY MASS INDEX: 19.43 KG/M2 | DIASTOLIC BLOOD PRESSURE: 78 MMHG | TEMPERATURE: 96.8 F | RESPIRATION RATE: 14 BRPM | HEART RATE: 81 BPM | OXYGEN SATURATION: 100 % | HEIGHT: 65 IN

## 2023-03-17 DIAGNOSIS — F41.9 ANXIETY: Primary | ICD-10-CM

## 2023-03-17 DIAGNOSIS — F10.11 HISTORY OF ALCOHOL ABUSE: ICD-10-CM

## 2023-03-17 DIAGNOSIS — F33.2 SEVERE EPISODE OF RECURRENT MAJOR DEPRESSIVE DISORDER, WITHOUT PSYCHOTIC FEATURES (HCC): ICD-10-CM

## 2023-03-17 DIAGNOSIS — Z78.9 ELECTRONIC CIGARETTE USE: ICD-10-CM

## 2023-03-17 DIAGNOSIS — G43.009 MIGRAINE WITHOUT AURA AND WITHOUT STATUS MIGRAINOSUS, NOT INTRACTABLE: ICD-10-CM

## 2023-03-17 DIAGNOSIS — L30.9 HAND DERMATITIS: ICD-10-CM

## 2023-03-17 DIAGNOSIS — Z79.899 MEDICAL MARIJUANA USE: ICD-10-CM

## 2023-03-17 DIAGNOSIS — E55.9 VITAMIN D DEFICIENCY: ICD-10-CM

## 2023-03-17 RX ORDER — BUPROPION HYDROCHLORIDE 300 MG/1
300 TABLET ORAL DAILY
Qty: 30 TABLET | Refills: 1 | Status: SHIPPED | OUTPATIENT
Start: 2023-03-17

## 2023-03-17 RX ORDER — ESCITALOPRAM OXALATE 10 MG/1
10 TABLET ORAL DAILY
Qty: 30 TABLET | Refills: 1 | Status: SHIPPED | OUTPATIENT
Start: 2023-03-17

## 2023-03-17 NOTE — ASSESSMENT & PLAN NOTE
Using for anxiety  Electronic cigarette smoking cessation and instead topical / oral alternatives advised

## 2023-03-17 NOTE — PATIENT INSTRUCTIONS
For headache and migraine prevention I would suggest a combination vitamin supplement which may include 1 or more of the following ingredients:  Magnesium 400 mg , Riboflavin (vitamin B2) 400 - 600 mg,  Butterbur 150 mg (PA free)  Other ingredients that may be helpful are feverfew, coenzyme Q10 100 mg three times a day,  melatonin and kiley  Some example brands that combine some of these ingredients are Migravent or Dolovent  Refer to the back of insurance card for counseling options  Apps and Websites for Counseling, Anxiety/Depression, Chronic Pain, Lifestyle Change, Problem-solving, Self-Esteem, Anger Management, Coping with Uncertainty    (Prices current as of 9/5/19)    Mood Kit - Available in Apple Sera Store for $4 99  Supports a person's success in specific situations, includes thought , mood tracker, and journal   Can be accessed in an unstructured way and used as an unguided self-help sera  2   Moodnotes - Available in Apple Sera Store for $4 99  Focuses on healthier thinking habits and identifying "traps" in thinking  Tracks mood over a period of time and identifies factors that influence mood  3   MoodMission - Available in [facility] for free  Includes five "missions" to promote confidence in handling stressors and coping in specific situations  The sera personalizes its style and techniques based on when the user engages it most frequently  In-sera rewards are used to motivate, increase fun and self-confidence  Helpful for patients who need improvement in mood or a decrease in anxiety and depression symptoms  4    What's Up - Available in [employer] for free  Recognizes negative thinking patterns and methods to overcome them  Uses helpful metaphors, a catastrophe scale, grounding techniques, and breathing exercises  Has the ability to sync data across multiple devices and protect this information with a unique pass code  Has the capability to be active in forums where people can discuss similar feelings and strategies that have been helpful  5   Moodpath - Available in [employer] for free  Uses daily screenings to create a better understanding of thoughts, feelings, and emotions  When needed, it provides a discussion guide to talking with a medical professional based on the responses to daily screenings  Includes over 150 psychological exercises and videos to promote and strengthen overall mental health  6   MindShift - Available in [employer] for free  Helpful for youth and young adults in coping with anxiety  Creates an individualized toolbox to help users deal with test anxiety, perfectionism, social anxiety, worry, panic, and conflict  Includes directions on how to construct “belief experiments” to trial common beliefs that feed anxiety, guided relaxation, as well as tools and tips to help establish and accomplish goals  Differentiates between helpful and unhelpful anxiety, and explains how to overcome fears by gradually facing them in manageable steps  7   CBT-I  - Available in [employer] for free  For insomnia  Educates about sleep, developing positive sleep routines, and improved sleep environment  Encourages user to change behaviors which will provide confidence for better sleep on a regular basis  8   [employer]  - Free website  Provides self-help and therapy resources that encourages change to combat negative and destructive thought patterns  Includes access to numerous handouts on a variety of symptoms related to anxiety, depression, low self-esteem, panic attacks, social disorders, and more  The solution section has interventions that can be printed and saved for future use  9   Woebot - Available in [employer] for free  Recommended for age 16+    Friendly self-care  that helps you think through situations with step-by-step guidance using counseling tools, learn about yourself with intelligent mood tracking, and master skills to reduce stress and live happier through 100+ evidence-based stories from clinicians  Chat as often or as little as you'd like, whenever you'd like to  10   Kian:  SEUN  CBT DBT Chatbot - Available in Apple appsstore and Google Play for free, has in-sera purchases  Recommended for 12+  Psychologist support at $30/month, 50% off to start  Tedra Perish / Luzma Aw is free  Uses techniques of CBT, DBT, yoga, and meditation to support your needs regarding stress, anxiety, sleep, loss, and a full range of other mental health and wellness issues  11   Curable Pain Relief - Available in Apple Sera store and Google Play for free, has in-sera purchases  Teaches about the chronic pain cycle and how to reverse it, while retraining your brain and nervous system for long-term results  Smart  Layne guides user through updates in pain science to identify, target, eliminate the factors that are keeping user "stuck" in the pain cycle  12   Talkspace Online Therapy - Available in Apple Sera store and Google Play for a fee  Subscription service, starting at $59/week (billed monthly)  All plans include unlimited messaging, and add live video sessions if desired  Unlimited messaging therapy for teens ages 15-14 and special services for couples therapy  You can change therapists or stop subscription renewal at any time  Recommended for 13+  User is matched with a licensed therapist in your state and communicate on your device by text, audio, and video from anywhere, at any time  User will hear back at least once a day, 5 days per week        Counseling services:    [facility]   [address] [facility], [address] [place] [address], [facility], [address] [place] [address]  [phone]    [address] [place] [address] (they have equine therapy too)  8 [facility], [address],  [place] [address]  [address], [address],  [place], [address]  [place] [address]   70 [facility], [address]   [address] [facility] [address]  [address] [facility], [address]  [place], [address] [facility] [address]    [facility]   [address], [place], [address]     [address] [address]   2102 [facility], [place], [address]    [address]  41 [facility], [address]  [address] [place] [address]  [email or web address]    CLAIRE Tong LSW  (patients age 11-adult)   [address], [address] [facility], [address]   1 [facility] 209 [facility], [address]   [facility], [address] [facility] 20, [facility], [facility], [place], [place] [address]  [address] [facility] (specializing in addiction)  [facility], [facility]  [address] [facility] [address] [facility] [address]  [address], [place], [address] [facility]   [place]      [facility], [address] [facility] [address] , [facility], [place], [address] [facility]   203 [facility], [address], [facility], [address]  [address] [facility], [address], [place], [address]  [phone]     Chikis Perry PsyD  1160 [facility], [place], [facility]   [phone]    Christina Desouza, [address] [address] [phone]      Hotlines:   Please program these numbers into your phone in case you or someone you know needs them  All services are free  65  People who call, text, or chat with 988 will be directly connected to the [address] [place] [address]  The existing Lifeline phone number ([phone]) will remain available   Callers can also connect with the [facility] or assistance in [place]  988 can be used by anyone, any time, at no cost  Trained crisis response professionals can support individuals considering suicide, self-harm, or any behavioral, substance abuse or misuse or mental health need for themselves or people looking for help for a loved one experiencing a mental health crisis  Lifeline services are available 24 hours a day, seven days a week at no cost to the caller  Crisis Text Line: text 376792     You are connected to a Crisis Counselor to bring a hot moment to a cool calm through active listening and collaborative problem solving  WarmLine:  622.559.7965 or 916-322-8548   They provide a supportive listening ear if you need it  They also can also provide information about mental health concerns and services  Crisis Line:  [facility] [phone],  [facility] [phone], [address] [place] [address] and [place]: [phone]   24/7 crisis counseling, on-site counseling and walk-in counseling services available  National Suicide Prevention Lifeline:  7-590-545-129-265-8488  [address] [facility] 7-174-171-789-464-6067   This is free, confidential, and available 24/7  [place]: [phone]   For those facing or having survived abuse 24 hour confidential help line, emergency safe house, counseling, advocacy and education  If you or someone your know are feeling as though you are going to hurt yourself, do not wait - GET HELP RIGHT AWAY  Go to the closest Emergency Room, call 911, or call someone you trust, family member or friend to be with you until you can get some help

## 2023-03-17 NOTE — ASSESSMENT & PLAN NOTE
Uncontrolled  Start Lexapro 10mg QD  Declines return to partial inpatient Psych  Continue Wellbutrin XL 300mg QD  Smart phone eliz list and counseling list provided today

## 2023-03-17 NOTE — ASSESSMENT & PLAN NOTE
Worsening, likely due to worsening mood  Continue Maxalt 10 mg p r n       For headache and migraine prevention I would suggest a combination vitamin supplement which may include 1 or more of the following ingredients:  Magnesium 400 mg , Riboflavin (vitamin B2) 400 - 600 mg,  Butterbur 150 mg ([place] free)  Other ingredients that may be helpful are feverfew, coenzyme Q10 100 mg three times a day,  melatonin and kiley  Some example brands that combine some of these ingredients are Migravent or Dolovent

## 2023-03-31 ENCOUNTER — OFFICE VISIT (OUTPATIENT)
Dept: OBGYN CLINIC | Facility: CLINIC | Age: 43
End: 2023-03-31

## 2023-03-31 VITALS
HEIGHT: 65 IN | SYSTOLIC BLOOD PRESSURE: 120 MMHG | WEIGHT: 115.2 LBS | BODY MASS INDEX: 19.19 KG/M2 | DIASTOLIC BLOOD PRESSURE: 90 MMHG

## 2023-03-31 DIAGNOSIS — N76.0 BV (BACTERIAL VAGINOSIS): ICD-10-CM

## 2023-03-31 DIAGNOSIS — N89.8 VAGINAL DISCHARGE: Primary | ICD-10-CM

## 2023-03-31 DIAGNOSIS — B96.89 BV (BACTERIAL VAGINOSIS): ICD-10-CM

## 2023-03-31 NOTE — PATIENT INSTRUCTIONS
Topics: vaginal irritation, vaginal discharge    Obtained appropriate cultures during today's exam  Will follow up on culture results and STD testing  Encouraged patient to call the office with any questions or concerns       Patient to follow up in 4-6 weeks for annual gynecologic exam

## 2023-03-31 NOTE — PROGRESS NOTES
Assessment/Plan:    No problem-specific Assessment & Plan notes found for this encounter  Diagnoses and all orders for this visit:    Vaginal discharge    BV (bacterial vaginosis)  -     Molecular Vaginal Panel  -     Trichomonas vaginalis/Mycoplasma genitalium PCR  -     Chlamydia/GC amplified DNA by PCR        Subjective:      Patient ID: Yuval Balderas is a 37 y o  female  Yamilet Gleason is a 45-year-old female who presents to the office for evaluation of vaginal itching and discharge  She notes internal vaginal itching and burning as well as gray-colored discharge for the last couple of days  She has had yeast infections and BV in the past  She denies any fever, chills, abdominal/pelvic pain, or urinary complaints  She notes that she had a new sexual partner recently and is requested STD testing  She does use barrier contraception sometimes  Appropriate cultures obtain, will also perform STD testing and follow up on results  Also discussed that patient is due for annual gynecologic exam  Encouraged patient to schedule appointment  Encouraged patient to call the office with any worsening of symptoms, questions, or concerns  Total time of today's visit was 25 minutes of which greater than 50% was spent face-to-face counseling the patient as well as coordination of care, review of chart lab values, physical examination with cultures as well as computer entry into the epic medical record system  The following portions of the patient's history were reviewed and updated as appropriate: allergies, current medications, past family history, past medical history, past social history, past surgical history and problem list     Review of Systems   Constitutional: Negative for chills and fever  Respiratory: Negative for shortness of breath  Cardiovascular: Negative for chest pain  Gastrointestinal: Negative for abdominal pain, constipation, diarrhea, nausea and vomiting     Genitourinary: "Positive for vaginal discharge  Negative for dysuria, frequency, pelvic pain, urgency and vaginal bleeding  Internal vaginal irritation         Objective:      /90   Ht 5' 5\" (1 651 m)   Wt 52 3 kg (115 lb 3 2 oz)   LMP 03/08/2023 (Exact Date)   BMI 19 17 kg/m²          Physical Exam  Exam conducted with a chaperone present  Constitutional:       Appearance: She is well-developed  HENT:      Head: Normocephalic  Eyes:      Pupils: Pupils are equal, round, and reactive to light  Pulmonary:      Effort: Pulmonary effort is normal  No respiratory distress  Genitourinary:     General: Normal vulva  Labia:         Right: No rash, tenderness, lesion or injury  Left: No rash, tenderness, lesion or injury  Urethra: No urethral swelling or urethral lesion  Vagina: Vaginal discharge (Thick gray vaginal discharge noted on exam ) present  Cervix: Normal       Uterus: Normal  Not enlarged and not tender  Adnexa: Right adnexa normal and left adnexa normal         Right: No tenderness  Left: No tenderness  Musculoskeletal:         General: Normal range of motion  Cervical back: Normal range of motion and neck supple  Skin:     General: Skin is warm and dry  Neurological:      General: No focal deficit present  Mental Status: She is alert and oriented to person, place, and time  Psychiatric:         Mood and Affect: Mood normal          Behavior: Behavior normal          Thought Content:  Thought content normal          Judgment: Judgment normal          "

## 2023-04-03 LAB
C GLABRATA DNA VAG QL NAA+PROBE: NEGATIVE
C KRUSEI DNA VAG QL NAA+PROBE: NEGATIVE
CANDIDA SP 6 PNL VAG NAA+PROBE: POSITIVE
M GENITALIUM DNA SPEC QL NAA+PROBE: NEGATIVE
T VAGINALIS DNA SPEC QL NAA+PROBE: NEGATIVE
T VAGINALIS DNA VAG QL NAA+PROBE: NEGATIVE
VAGINOSIS/ITIS DNA PNL VAG PROBE+SIG AMP: NEGATIVE

## 2023-04-04 ENCOUNTER — TELEPHONE (OUTPATIENT)
Dept: OBGYN CLINIC | Facility: CLINIC | Age: 43
End: 2023-04-04

## 2023-04-04 NOTE — TELEPHONE ENCOUNTER
Pt  Is calling about her test results from 3/31  Pt aware that one of the test results is not available yet

## 2023-04-04 NOTE — TELEPHONE ENCOUNTER
Pt is in a lot of discomfort and was hoping that a prescription can be called in today for the positive yeast infec

## 2023-04-05 DIAGNOSIS — B37.31 YEAST VAGINITIS: Primary | ICD-10-CM

## 2023-04-05 RX ORDER — FLUCONAZOLE 150 MG/1
150 TABLET ORAL ONCE
Qty: 1 TABLET | Refills: 0 | Status: SHIPPED | OUTPATIENT
Start: 2023-04-05 | End: 2023-04-05

## 2023-04-05 NOTE — TELEPHONE ENCOUNTER
----- Message from Favian Flood MD sent at 4/4/2023  1:01 PM EDT -----  Culture was positive for yeast    Please treat with Diflucan 150 mg dispense 1 tablet take the 1 tablet now    Thanks

## 2023-04-06 LAB
C TRACH DNA SPEC QL NAA+PROBE: NEGATIVE
N GONORRHOEA DNA SPEC QL NAA+PROBE: NEGATIVE

## 2023-06-12 DIAGNOSIS — F33.2 SEVERE EPISODE OF RECURRENT MAJOR DEPRESSIVE DISORDER, WITHOUT PSYCHOTIC FEATURES (HCC): ICD-10-CM

## 2023-06-12 RX ORDER — BUPROPION HYDROCHLORIDE 300 MG/1
TABLET ORAL
Qty: 30 TABLET | Refills: 1 | Status: SHIPPED | OUTPATIENT
Start: 2023-06-12

## 2023-07-13 DIAGNOSIS — G43.009 MIGRAINE WITHOUT AURA AND WITHOUT STATUS MIGRAINOSUS, NOT INTRACTABLE: ICD-10-CM

## 2023-07-14 RX ORDER — RIZATRIPTAN BENZOATE 10 MG/1
TABLET ORAL
Qty: 6 TABLET | Refills: 0 | Status: SHIPPED | OUTPATIENT
Start: 2023-07-14

## 2023-08-31 DIAGNOSIS — G43.009 MIGRAINE WITHOUT AURA AND WITHOUT STATUS MIGRAINOSUS, NOT INTRACTABLE: ICD-10-CM

## 2023-08-31 RX ORDER — RIZATRIPTAN BENZOATE 10 MG/1
TABLET ORAL
Qty: 6 TABLET | Refills: 0 | Status: SHIPPED | OUTPATIENT
Start: 2023-08-31

## 2023-09-03 DIAGNOSIS — F33.2 SEVERE EPISODE OF RECURRENT MAJOR DEPRESSIVE DISORDER, WITHOUT PSYCHOTIC FEATURES (HCC): ICD-10-CM

## 2023-09-03 RX ORDER — BUPROPION HYDROCHLORIDE 300 MG/1
TABLET ORAL
Qty: 30 TABLET | Refills: 1 | Status: SHIPPED | OUTPATIENT
Start: 2023-09-03

## 2023-09-27 ENCOUNTER — ANNUAL EXAM (OUTPATIENT)
Dept: OBGYN CLINIC | Facility: CLINIC | Age: 43
End: 2023-09-27
Payer: MEDICARE

## 2023-09-27 VITALS
BODY MASS INDEX: 19.06 KG/M2 | DIASTOLIC BLOOD PRESSURE: 80 MMHG | WEIGHT: 114.4 LBS | HEIGHT: 65 IN | SYSTOLIC BLOOD PRESSURE: 108 MMHG

## 2023-09-27 DIAGNOSIS — Z12.31 ENCOUNTER FOR SCREENING MAMMOGRAM FOR MALIGNANT NEOPLASM OF BREAST: ICD-10-CM

## 2023-09-27 DIAGNOSIS — Z01.419 ENCOUNTER FOR GYNECOLOGICAL EXAMINATION WITHOUT ABNORMAL FINDING: Primary | ICD-10-CM

## 2023-09-27 DIAGNOSIS — N90.89 VULVAR IRRITATION: ICD-10-CM

## 2023-09-27 DIAGNOSIS — Z01.419 PAP SMEAR, AS PART OF ROUTINE GYNECOLOGICAL EXAMINATION: ICD-10-CM

## 2023-09-27 PROCEDURE — G0145 SCR C/V CYTO,THINLAYER,RESCR: HCPCS | Performed by: OBSTETRICS & GYNECOLOGY

## 2023-09-27 PROCEDURE — 99396 PREV VISIT EST AGE 40-64: CPT | Performed by: OBSTETRICS & GYNECOLOGY

## 2023-09-27 PROCEDURE — G0476 HPV COMBO ASSAY CA SCREEN: HCPCS | Performed by: OBSTETRICS & GYNECOLOGY

## 2023-09-27 NOTE — PATIENT INSTRUCTIONS
Normal gynecological physical examination. Self-breast examination stressed. Mammogram ordered. Patient was instructed to apply hydrocortisone to the rash as well as try warm tea baths. If she needs a dermatologist I gave her the name of Dr. Ely Smith for referral.  Discussed regular exercise, healthy diet, importance of vitamin D and calcium supplements. Discussed importance of sun block use during periods of prolonged sun exposure. Patient will be seen in 1 year for routine gynecologic and medical examination. Patient will call office for any problems, concerns, or issues which may arise during the interim.

## 2023-09-27 NOTE — PROGRESS NOTES
Assessment/Plan:    No problem-specific Assessment & Plan notes found for this encounter. Diagnoses and all orders for this visit:    Encounter for gynecological examination without abnormal finding  -     Liquid-based pap, screening    Pap smear, as part of routine gynecological examination    Encounter for screening mammogram for malignant neoplasm of breast  -     Mammo screening bilateral w 3d & cad; Future    Vulvar irritation          Normal gynecological physical examination. Self-breast examination stressed. Mammogram ordered. Discussed regular exercise, healthy diet, importance of vitamin D and calcium supplements. Discussed importance of sun block use during periods of prolonged sun exposure. Patient will be seen in 1 year for routine gynecologic and medical examination. Patient will call office for any problems, concerns, or issues which may arise during the interim. Subjective:          Shaina Seymoru is a 17-year-old female sent to the office for an annual exam.  Her periods are regular with no intermenstrual bleeding. She does not experience any vasomotor symptoms. She notes that she has a rash on her vulva that comes and goes, she says it is similar to her eczema that she experiences. Physical exam revealed mild vulvar irritation. Patient was encouraged to continue using hydrocortisone to relieve the itch and warm tea baths. Patient denies breast changes, abdominal pain, pelvic pain, changes in urination, changes in bowel movements, abnormal vaginal discharge, vaginal bleeding. Patient is up-to-date with screenings. Patient was encouraged to call if she has any questions, complaints, or concerns in the interim. Patient ID: Jimena Mueller is a 37 y.o. female who presents today for her annual gynecologic and medical examination    Menstrual status: Last menstrual period was September 21. Her periods are regular.     Vasomotor symptoms: Patient denies any vasomotor symptoms. Patient reports normal appetite    Patient reports normal bowel and bladder habits    Patient denies any significant pelvic or abdominal pain    Patient denies any headaches, chest pain, shortness of breath fever shakes or chills    Patient denies any COVID 19 symptoms including cough or loss of taste or smell    COVID vaccine status: Patient is up-to-date with COVID vaccination. Medical problems: None    Colonoscopy status: Not indicated at this time. Mammogram status: Patient is up-to-date with mammogram.  Self breast exams were encouraged. The following portions of the patient's history were reviewed and updated as appropriate: allergies, current medications, past family history, past medical history, past social history, past surgical history and problem list.    Review of Systems   Constitutional: Negative. Negative for appetite change, diaphoresis, fatigue, fever and unexpected weight change. HENT: Negative. Eyes: Negative. Respiratory: Negative. Cardiovascular: Negative. Gastrointestinal: Negative. Negative for abdominal pain, blood in stool, constipation, diarrhea, nausea and vomiting. Endocrine: Negative. Negative for cold intolerance and heat intolerance. Genitourinary: Negative. Negative for dysuria, frequency, hematuria, urgency, vaginal bleeding, vaginal discharge and vaginal pain. Musculoskeletal: Negative. Skin: Positive for rash. Patient has eczema on her posterior elbow. Patient also notes having a rash similar to eczema on her vulva. Allergic/Immunologic: Negative. Neurological: Negative. Hematological: Negative. Negative for adenopathy. Psychiatric/Behavioral: Negative. Objective:      /80   Ht 5' 5" (1.651 m)   Wt 51.9 kg (114 lb 6.4 oz)   LMP 09/21/2023 (Exact Date)   BMI 19.04 kg/m²          Physical Exam  Constitutional:       General: She is not in acute distress. Appearance: Normal appearance.  She is well-developed. She is not diaphoretic. HENT:      Head: Normocephalic and atraumatic. Eyes:      Pupils: Pupils are equal, round, and reactive to light. Cardiovascular:      Rate and Rhythm: Normal rate and regular rhythm. Heart sounds: Normal heart sounds. No murmur heard. No friction rub. No gallop. Pulmonary:      Effort: Pulmonary effort is normal.      Breath sounds: Normal breath sounds. Chest:   Breasts:     Breasts are symmetrical.      Right: No inverted nipple, mass, nipple discharge, skin change or tenderness. Left: No inverted nipple, mass, nipple discharge, skin change or tenderness. Abdominal:      General: Bowel sounds are normal.      Palpations: Abdomen is soft. Genitourinary:     General: Normal vulva. Exam position: Supine. Labia:         Right: Rash present. No lesion. Left: Rash present. No lesion. Vagina: Normal. No vaginal discharge, erythema, tenderness or bleeding. Cervix: No discharge or friability. Uterus: Not enlarged and not tender. Adnexa:         Right: No mass, tenderness or fullness. Left: No mass, tenderness or fullness. Rectum: Normal.      Comments: External genitalia reveals a mild erythematous rash. Musculoskeletal:         General: Normal range of motion. Cervical back: Normal range of motion and neck supple. Lymphadenopathy:      Cervical: No cervical adenopathy. Upper Body:      Right upper body: No supraclavicular adenopathy. Left upper body: No supraclavicular adenopathy. Skin:     General: Skin is warm and dry. Findings: No rash. Neurological:      General: No focal deficit present. Mental Status: She is alert and oriented to person, place, and time. Psychiatric:         Mood and Affect: Mood normal.         Speech: Speech normal.         Behavior: Behavior normal.         Thought Content:  Thought content normal.         Judgment: Judgment normal.

## 2023-10-05 LAB
LAB AP GYN PRIMARY INTERPRETATION: NORMAL
Lab: NORMAL

## 2023-10-26 DIAGNOSIS — G43.009 MIGRAINE WITHOUT AURA AND WITHOUT STATUS MIGRAINOSUS, NOT INTRACTABLE: ICD-10-CM

## 2023-10-26 RX ORDER — RIZATRIPTAN BENZOATE 10 MG/1
TABLET ORAL
Qty: 6 TABLET | Refills: 0 | Status: SHIPPED | OUTPATIENT
Start: 2023-10-26

## 2024-01-11 ENCOUNTER — OFFICE VISIT (OUTPATIENT)
Dept: FAMILY MEDICINE CLINIC | Facility: CLINIC | Age: 44
End: 2024-01-11
Payer: MEDICARE

## 2024-01-11 VITALS
WEIGHT: 116.6 LBS | TEMPERATURE: 97.6 F | HEIGHT: 65 IN | HEART RATE: 79 BPM | OXYGEN SATURATION: 100 % | DIASTOLIC BLOOD PRESSURE: 70 MMHG | BODY MASS INDEX: 19.43 KG/M2 | RESPIRATION RATE: 16 BRPM | SYSTOLIC BLOOD PRESSURE: 106 MMHG

## 2024-01-11 DIAGNOSIS — Z23 ENCOUNTER FOR IMMUNIZATION: ICD-10-CM

## 2024-01-11 DIAGNOSIS — L30.9 HAND DERMATITIS: ICD-10-CM

## 2024-01-11 DIAGNOSIS — F41.9 ANXIETY: ICD-10-CM

## 2024-01-11 DIAGNOSIS — F10.11 HISTORY OF ALCOHOL ABUSE: ICD-10-CM

## 2024-01-11 DIAGNOSIS — Z00.00 ANNUAL PHYSICAL EXAM: Primary | ICD-10-CM

## 2024-01-11 DIAGNOSIS — G43.009 MIGRAINE WITHOUT AURA AND WITHOUT STATUS MIGRAINOSUS, NOT INTRACTABLE: ICD-10-CM

## 2024-01-11 DIAGNOSIS — Z78.9 ELECTRONIC CIGARETTE USE: ICD-10-CM

## 2024-01-11 DIAGNOSIS — Z79.899 MEDICAL MARIJUANA USE: ICD-10-CM

## 2024-01-11 DIAGNOSIS — E55.9 VITAMIN D DEFICIENCY: ICD-10-CM

## 2024-01-11 DIAGNOSIS — F33.2 SEVERE EPISODE OF RECURRENT MAJOR DEPRESSIVE DISORDER, WITHOUT PSYCHOTIC FEATURES (HCC): ICD-10-CM

## 2024-01-11 PROCEDURE — 99396 PREV VISIT EST AGE 40-64: CPT | Performed by: FAMILY MEDICINE

## 2024-01-11 PROCEDURE — 99213 OFFICE O/P EST LOW 20 MIN: CPT | Performed by: FAMILY MEDICINE

## 2024-01-11 PROCEDURE — 90471 IMMUNIZATION ADMIN: CPT

## 2024-01-11 PROCEDURE — 90715 TDAP VACCINE 7 YRS/> IM: CPT

## 2024-01-11 RX ORDER — DULOXETIN HYDROCHLORIDE 60 MG/1
60 CAPSULE, DELAYED RELEASE ORAL DAILY
Qty: 30 CAPSULE | Refills: 1 | Status: SHIPPED | OUTPATIENT
Start: 2024-01-11

## 2024-01-11 RX ORDER — BUPROPION HYDROCHLORIDE 300 MG/1
300 TABLET ORAL DAILY
Qty: 90 TABLET | Refills: 0 | Status: SHIPPED | OUTPATIENT
Start: 2024-01-11

## 2024-01-11 RX ORDER — RIZATRIPTAN BENZOATE 10 MG/1
TABLET ORAL
Qty: 9 TABLET | Refills: 0 | Status: SHIPPED | OUTPATIENT
Start: 2024-01-11

## 2024-01-11 RX ORDER — DULOXETIN HYDROCHLORIDE 30 MG/1
30 CAPSULE, DELAYED RELEASE ORAL DAILY
Qty: 7 CAPSULE | Refills: 0 | Status: SHIPPED | OUTPATIENT
Start: 2024-01-11 | End: 2024-01-18

## 2024-01-11 NOTE — PROGRESS NOTES
Assessment/Plan:  Problem List Items Addressed This Visit          Cardiovascular and Mediastinum    Migraine     Stable.  Continue Maxalt 10 mg p.r.n..    For headache and migraine prevention I would suggest a combination vitamin supplement which may include 1 or more of the following ingredients:  Magnesium 400 mg , Riboflavin (vitamin B2) 400 - 600 mg,  Butterbur 150 mg (PA free). Other ingredients that may be helpful are feverfew, coenzyme Q10 100 mg three times a day,  melatonin and kiley.  Some example brands that combine some of these ingredients are Migravent or Dolovent.                Relevant Medications    buPROPion (WELLBUTRIN XL) 300 mg 24 hr tablet    rizatriptan (MAXALT) 10 mg tablet    DULoxetine (CYMBALTA) 30 mg delayed release capsule    DULoxetine (CYMBALTA) 60 mg delayed release capsule       Musculoskeletal and Integument    Hand dermatitis     Stable on Elocon PRN.              Other    Anxiety     Uncontrolled.  Start Cymbalta 60mg QD.  Continue Wellbutrin XL 300mg QD.  Continue counseling.           Relevant Medications    DULoxetine (CYMBALTA) 30 mg delayed release capsule    DULoxetine (CYMBALTA) 60 mg delayed release capsule    Vitamin D deficiency     Pending labs.  Continue MVI.           Depression     Uncontrolled.  Start Cymbalta 60mg QD.  Continue Wellbutrin XL 300mg QD.  Continue counseling.           Relevant Medications    buPROPion (WELLBUTRIN XL) 300 mg 24 hr tablet    DULoxetine (CYMBALTA) 30 mg delayed release capsule    DULoxetine (CYMBALTA) 60 mg delayed release capsule    Electronic cigarette use     Contemplative.  Electronic cigarette smoking cessation advised.         History of alcohol abuse     In remission.  No longer attending AA.         Medical marijuana use     Using for anxiety.  Electronic cigarette smoking cessation and instead topical / oral alternatives advised.          Other Visit Diagnoses       Annual physical exam    -  Primary    Health Maintenance    Topic Date Due   • Breast Cancer Screening: Mammogram  02/18/2024   • Depression Screening  03/17/2024   • Hepatitis A Vaccine (1 of 2 - Risk 2-dose series) 03/17/2024 (Originally 3/21/1999)   • COVID-19 Vaccine (3 - 2023-24 season) 04/11/2024 (Originally 9/1/2023)   • Influenza Vaccine (1) 06/30/2024 (Originally 9/1/2023)   • Depression Follow-up Plan  03/17/2024   • Annual Physical  01/11/2025   • Cervical Cancer Screening  09/27/2028   • Zoster Vaccine (1 of 2) 03/21/2030   • DTaP,Tdap,and Td Vaccines (2 - Td or Tdap) 01/11/2034   • HIV Screening  Completed   • Hepatitis C Screening  Completed   • Pneumococcal Vaccine: Pediatrics (0 to 5 Years) and At-Risk Patients (6 to 64 Years)  Aged Out   • HIB Vaccine  Aged Out   • IPV Vaccine  Aged Out   • Meningococcal ACWY Vaccine  Aged Out   • HPV Vaccine  Aged Out         Encounter for immunization        Relevant Orders    TDAP VACCINE GREATER THAN OR EQUAL TO 6YO IM (Completed)               Return in about 6 weeks (around 2/22/2024) for Virtual - F/U Anx/Dep, Migraine, Vit D Def, ECig, Labs.      Future Appointments   Date Time Provider Department Center   2/22/2024  1:20 PM Patricia Collado,  [facility]          Subjective:     Anette is a 43 y.o. female who presents today for a follow-up on her chronic medical conditions.        HPI:  Chief Complaint   Patient presents with   • Physical Exam     Overdue F/U Anx/Dep, Migraine, Vit D Def, ECig, Labs and physical. No new problems or concerns at this time. Labs not done.    • HM     Last Tdap is unknown, believes it was within the last 5 years with [facility]. Declines flu shot today. No additional covid boosters.        -- Above per clinical staff and reviewed. --      HPI      Today:      Return in 6 weeks (on 4/28/2023) for 40min  F/U Anx/Dep, Migraine, Vit D Def, ECig, Labs - No appt scheduled.    4mo OV - Overdue    Patient did not complete labs 12/18/23        HTN - Improved s/p ETOH cessation 2019.   Previously on Metoprolol.  No BP check outside of office.  Watching diet.  Drinking 3-4 Ensure protein drinks daily due to lack of appetite since  when son Noman entered rehab, then  from drug overdose Summer 2022.  Eating 1-2 meals daily.  +Exercise - previously  Walking for 30 minutes, 2-3 times per week.       Depression / Anxiety - Mood improving after the holidays, 2nd holiday after sun's overdose death.  She stopped taking Lexapro 10mg QD after a couple days due to fatigue.  s/p Partial Inpatient Psych 10/12/22 - 10/13/22 for 1 day, as she wanted to pursue individual therapy.   Mood worse since  when son Noman entered rehab, then  from drug overdose .  Decreased appetite.  On Wellbutrin XL 300mg QD.  Good social supports.  No SI/HI/AH/VH.  Feels safe at home.  Sees counselor via Zoom weekly - next appt .  Attends [employer] child loss support group every other week since , which has been helpful - next appt .  She has been self-isolating by choice as she no longer prefers group settings.  Last saw Psychiatrist in early  for anxiety.  Previously on Prozac, Zoloft, Paxil - unsure of why Rx was D/C, Lexapro D/C due to fatigue.  No higher dose of Wellbutrin previously.          PHQ-2/9 Depression Screening    Little interest or pleasure in doing things: 1 - several days  Feeling down, depressed, or hopeless: 2 - more than half the days  Trouble falling or staying asleep, or sleeping too much: 0 - not at all  Feeling tired or having little energy: 0 - not at all  Poor appetite or overeatin - more than half the days  Feeling bad about yourself - or that you are a failure or have let yourself or your family down: 1 - several days  Trouble concentrating on things, such as reading the newspaper or watching television: 1 - several days  Moving or speaking so slowly that other people could have noticed. Or the opposite - being so fidgety or restless that you have been moving around a lot  more than usual: 1 - several days  Thoughts that you would be better off dead, or of hurting yourself in some way: 0 - not at all  PHQ-9 Score: 8  PHQ-9 Interpretation: Mild depression       SEN-7 Flowsheet Screening    Flowsheet Row Most Recent Value   Over the last 2 weeks, how often have you been bothered by any of the following problems?    Feeling nervous, anxious, or on edge 3   Not being able to stop or control worrying 3   Worrying too much about different things 3   Trouble relaxing 2   Being so restless that it is hard to sit still 1   Becoming easily annoyed or irritable 1   Feeling afraid as if something awful might happen 3   SEN-7 Total Score 16              H/O ETOH Abuse - In remission.  No longer attends AA once weekly.  No sponsor.  Never attended rehab.  Last drink 2019.     Migraine s Aura - On Maxalt 10mg PRN.  Has HA prior to migraine..  Has associated nausea.  Occurs 1-2 times per month (previously occurring every other month).  Occurs c menses.  Usually only need to take 1 Maxalt per daily x 2- 3 days.  She is taking 2-3 pills per month.  Previously on Imitrex - D/C due to chest pain.  No Neuro consult or brain imaging previously.        Hand Dermatitis - Stable.  Using Elocon PRN - every other week.        Vitamin D Deficiency - s/p Drisdol.  Taking MVI, but not OTC Vitamin D.      E- cigarette Smoker & Medial Marijuana - Contemplative.  Using Medical Marijuana vape at night for mood.  Advised non-inhalant forms of marijuana instead.         Reviewed:  Labs 10/18/22, Gyn 9/27/23, Chiro 11/28/22     Sees Gyn Ms. Melissa Ugadle PA-C at [facility].  Next appt 9/24    Sees Dentist q6 months.  Overdue for Optho.      The following portions of the patient's history were reviewed and updated as appropriate: allergies, current medications, past family history, past medical history, past social history, past surgical history and problem list.      Review of Systems  "  Constitutional:  Negative for appetite change, chills, diaphoresis, fatigue and fever.   Respiratory:  Negative for chest tightness and shortness of breath.    Cardiovascular:  Negative for chest pain.   Gastrointestinal:  Negative for abdominal pain, blood in stool, diarrhea, nausea and vomiting.   Genitourinary:  Negative for dysuria.   Neurological:  Negative for headaches.        Current Outpatient Medications   Medication Sig Dispense Refill   • buPROPion (WELLBUTRIN XL) 300 mg 24 hr tablet Take 1 tablet (300 mg total) by mouth daily 90 tablet 0   • CANNABIDIOL PO Take by mouth Medical Marijuana Vape nightly for anxiety     • DULoxetine (CYMBALTA) 30 mg delayed release capsule Take 1 capsule (30 mg total) by mouth daily for 7 days 7 capsule 0   • DULoxetine (CYMBALTA) 60 mg delayed release capsule Take 1 capsule (60 mg total) by mouth daily 30 capsule 1   • multivitamin (THERAGRAN) TABS Take 1 tablet by mouth daily     • rizatriptan (MAXALT) 10 mg tablet TAKE 1 TABLET BY MOUTH DAILY AS NEEDED FOR MIGRAINE MAY REPEAT  IN 2 HOURS IF SYMPTOMS PERSIST. MAX TABLETS 3 PER 24 HOURS 9 tablet 0     No current facility-administered medications for this visit.       Objective:  /70   Pulse 79   Temp 97.6 °F (36.4 °C)   Resp 16   Ht 5' 5\" (1.651 m)   Wt 52.9 kg (116 lb 9.6 oz)   SpO2 100%   BMI 19.40 kg/m²    Wt Readings from Last 3 Encounters:   01/11/24 52.9 kg (116 lb 9.6 oz)   09/27/23 51.9 kg (114 lb 6.4 oz)   03/31/23 52.3 kg (115 lb 3.2 oz)      BP Readings from Last 3 Encounters:   01/11/24 106/70   09/27/23 108/80   03/31/23 120/90          Physical Exam  Vitals and nursing note reviewed.   Constitutional:       Appearance: Normal appearance. She is well-developed.   HENT:      Head: Normocephalic and atraumatic.      Right Ear: Tympanic membrane, ear canal and external ear normal.      Left Ear: Tympanic membrane, ear canal and external ear normal.      Nose: Nose normal.      Right Sinus: No " maxillary sinus tenderness or frontal sinus tenderness.      Left Sinus: No maxillary sinus tenderness or frontal sinus tenderness.      Mouth/Throat:      Mouth: Mucous membranes are moist.      Pharynx: Oropharynx is clear. Uvula midline.      Tonsils: No tonsillar exudate.   Eyes:      Extraocular Movements: Extraocular movements intact.      Conjunctiva/sclera: Conjunctivae normal.      Pupils: Pupils are equal, round, and reactive to light.   Cardiovascular:      Rate and Rhythm: Normal rate and regular rhythm.      Pulses: Normal pulses.      Heart sounds: Normal heart sounds.   Pulmonary:      Effort: Pulmonary effort is normal.      Breath sounds: Normal breath sounds.   Abdominal:      General: Bowel sounds are normal. There is no distension.      Palpations: Abdomen is soft. There is no mass.      Tenderness: There is no abdominal tenderness. There is no guarding or rebound.   Musculoskeletal:         General: No swelling or tenderness.      Cervical back: Neck supple.      Right lower leg: No edema.      Left lower leg: No edema.   Lymphadenopathy:      Cervical: No cervical adenopathy.   Skin:     Findings: No rash.   Neurological:      General: No focal deficit present.      Mental Status: She is alert and oriented to person, place, and time.      Cranial Nerves: No cranial nerve deficit.      Sensory: No sensory deficit.      Motor: No weakness.      Coordination: Coordination normal.      Deep Tendon Reflexes: Reflexes normal.   Psychiatric:         Mood and Affect: Mood normal.         Behavior: Behavior normal.         Thought Content: Thought content normal.         Judgment: Judgment normal.         Lab Results:      Lab Results   Component Value Date    WBC 6.58 10/18/2022    HGB 13.6 10/18/2022    HCT 40.4 10/18/2022     10/18/2022    TRIG 86 10/18/2022    HDL 59 10/18/2022    LDLDIRECT 49 10/18/2022    ALT 14 10/18/2022    AST 15 10/18/2022     07/14/2015    K 3.8 10/18/2022    CL  "103 10/18/2022    CREATININE 0.69 10/18/2022    BUN 14 10/18/2022    CO2 29 10/18/2022    INR 1.03 07/14/2015    GLUF 91 10/18/2022    HGBA1C 5.1 10/18/2022     No results found for: \"URICACID\"  Invalid input(s): \"BASENAME\" Vitamin D    No results found.     POCT Labs        Depression Screening and Follow-up Plan: Patient's depression screening was positive with a PHQ-9 score of 8.     Tobacco Cessation Counseling: Tobacco cessation counseling was provided. The patient is sincerely urged to quit consumption of tobacco. She is not ready to quit tobacco. Medication options discussed.                     "

## 2024-01-11 NOTE — ASSESSMENT & PLAN NOTE
Stable.  Continue Maxalt 10 mg p.r.n..    For headache and migraine prevention I would suggest a combination vitamin supplement which may include 1 or more of the following ingredients:  Magnesium 400 mg , Riboflavin (vitamin B2) 400 - 600 mg,  Butterbur 150 mg (PA free). Other ingredients that may be helpful are feverfew, coenzyme Q10 100 mg three times a day,  melatonin and kiley.  Some example brands that combine some of these ingredients are Migravent or Dolovent.

## 2024-01-11 NOTE — ASSESSMENT & PLAN NOTE
Using for anxiety.  Electronic cigarette smoking cessation and instead topical / oral alternatives advised.

## 2024-01-11 NOTE — PATIENT INSTRUCTIONS
Low vitamin D - Recommend start multivitamin and over-the-counter vitamin D3 1000 - 3000 International Units daily.    For headache and migraine prevention I would suggest a combination vitamin supplement which may include 1 or more of the following ingredients:  Magnesium 400 mg , Riboflavin (vitamin B2) 400 - 600 mg,  Butterbur 150 mg (PA free). Other ingredients that may be helpful are feverfew, coenzyme Q10 100 mg three times a day,  melatonin and kiley.  Some example brands that combine some of these ingredients are Migravent or Dolovent.         Wellness Visit for Adults   AMBULATORY CARE:   A wellness visit  is when you see your healthcare provider to get screened for health problems. Your healthcare provider will also give you advice on how to stay healthy. Write down your questions so you remember to ask them. Ask your healthcare provider how often you should have a wellness visit.  What happens at a wellness visit:  Your healthcare provider will ask about your health, and your family history of health problems. This includes high blood pressure, heart disease, and cancer. He or she will ask if you have symptoms that concern you, if you smoke, and about your mood. You may also be asked about your intake of medicines, supplements, food, and alcohol. Any of the following may be done:  Your weight  will be checked. Your height may also be checked so your body mass index (BMI) can be calculated. Your BMI shows if you are at a healthy weight.    Your blood pressure  and heart rate will be checked. Your temperature may also be checked.    Blood and urine tests  may be done. Blood tests may be done to check your cholesterol levels. Abnormal cholesterol levels increase your risk for heart disease and stroke. You may also need a blood or urine test to check for diabetes if you are at increased risk. Urine tests may be done to look for signs of an infection or kidney disease.    A physical exam  includes  checking your heartbeat and lungs with a stethoscope. Your healthcare provider may also check your skin to look for sun damage.    Screening tests  may be recommended. A screening test is done to check for diseases that may not cause symptoms. The screening tests you may need depend on your age, gender, family history, and lifestyle habits. For example, colorectal screening may be recommended if you are 50 years old or older.    Screening tests you need if you are a woman:   A Pap smear  is used to screen for cervical cancer. Pap smears are usually done every 3 to 5 years depending on your age. You may need them more often if you have had abnormal Pap smear test results in the past. Ask your healthcare provider how often you should have a Pap smear.    A mammogram  is an x-ray of your breasts to screen for breast cancer. Experts recommend mammograms every 2 years starting at age 50 years. You may need a mammogram at age 49 years or younger if you have an increased risk for breast cancer. Talk to your healthcare provider about when you should start having mammograms and how often you need them.    Vaccines you may need:   Get an influenza vaccine  every year. The influenza vaccine protects you from the flu. Several types of viruses cause the flu. The viruses change over time, so new vaccines are made each year.    Get a tetanus-diphtheria (Td) booster vaccine  every 10 years. This vaccine protects you against tetanus and diphtheria. Tetanus is a severe infection that may cause painful muscle spasms and lockjaw. Diphtheria is a severe bacterial infection that causes a thick covering in the back of your mouth and throat.    Get a human papillomavirus (HPV) vaccine  if you are female and aged 19 to 26 or male 19 to 21 and never received it. This vaccine protects you from HPV infection. HPV is the most common infection spread by sexual contact. HPV may also cause vaginal, penile, and anal cancers.    Get a pneumococcal  vaccine  if you are aged 65 years or older. The pneumococcal vaccine is an injection given to protect you from pneumococcal disease. Pneumococcal disease is an infection caused by pneumococcal bacteria. The infection may cause pneumonia, meningitis, or an ear infection.    Get a shingles vaccine  if you are 60 or older, even if you have had shingles before. The shingles vaccine is an injection to protect you from the varicella-zoster virus. This is the same virus that causes chickenpox. Shingles is a painful rash that develops in people who had chickenpox or have been exposed to the virus.    How to eat healthy:  My Plate is a model for planning healthy meals. It shows the types and amounts of foods that should go on your plate. Fruits and vegetables make up about half of your plate, and grains and protein make up the other half. A serving of dairy is included on the side of your plate. The amount of calories and serving sizes you need depends on your age, gender, weight, and height. Examples of healthy foods are listed below:  Eat a variety of vegetables  such as dark green, red, and orange vegetables. You can also include canned vegetables low in sodium (salt) and frozen vegetables without added butter or sauces.    Eat a variety of fresh fruits , canned fruit in 100% juice, frozen fruit, and dried fruit.    Include whole grains.  At least half of the grains you eat should be whole grains. Examples include whole-wheat bread, wheat pasta, brown rice, and whole-grain cereals such as oatmeal.    Eat a variety of protein foods such as seafood (fish and shellfish), lean meat, and poultry without skin (turkey and chicken). Examples of lean meats include pork leg, shoulder, or tenderloin, and beef round, sirloin, tenderloin, and extra lean ground beef. Other protein foods include eggs and egg substitutes, beans, peas, soy products, nuts, and seeds.    Choose low-fat dairy products such as skim or 1% milk or low-fat  yogurt, cheese, and cottage cheese.    Limit unhealthy fats  such as butter, hard margarine, and shortening.       Exercise:  Exercise at least 30 minutes per day on most days of the week. Some examples of exercise include walking, biking, dancing, and swimming. You can also fit in more physical activity by taking the stairs instead of the elevator or parking farther away from stores. Include muscle strengthening activities 2 days each week. Regular exercise provides many health benefits. It helps you manage your weight, and decreases your risk for type 2 diabetes, heart disease, stroke, and high blood pressure. Exercise can also help improve your mood. Ask your healthcare provider about the best exercise plan for you.       General health and safety guidelines:   Do not smoke.  Nicotine and other chemicals in cigarettes and cigars can cause lung damage. Ask your healthcare provider for information if you currently smoke and need help to quit. E-cigarettes or smokeless tobacco still contain nicotine. Talk to your healthcare provider before you use these products.    Limit alcohol.  A drink of alcohol is 12 ounces of beer, 5 ounces of wine, or 1½ ounces of liquor.    Lose weight, if needed.  Being overweight increases your risk of certain health conditions. These include heart disease, high blood pressure, type 2 diabetes, and certain types of cancer.    Protect your skin.  Do not sunbathe or use tanning beds. Use sunscreen with a SPF 15 or higher. Apply sunscreen at least 15 minutes before you go outside. Reapply sunscreen every 2 hours. Wear protective clothing, hats, and sunglasses when you are outside.    Drive safely.  Always wear your seatbelt. Make sure everyone in your car wears a seatbelt. A seatbelt can save your life if you are in an accident. Do not use your cell phone when you are driving. This could distract you and cause an accident. Pull over if you need to make a call or send a text  message.    Practice safe sex.  Use latex condoms if are sexually active and have more than one partner. Your healthcare provider may recommend screening tests for sexually transmitted infections (STIs).    Wear helmets, lifejackets, and protective gear.  Always wear a helmet when you ride a bike or motorcycle, go skiing, or play sports that could cause a head injury. Wear protective equipment when you play sports. Wear a lifejacket when you are on a boat or doing water sports.    © Copyright [employer] 2023 Information is for End User's use only and may not be sold, redistributed or otherwise used for commercial purposes.  The above information is an  only. It is not intended as medical advice for individual conditions or treatments. Talk to your doctor, nurse or pharmacist before following any medical regimen to see if it is safe and effective for you.    Cholesterol and Your Health   AMBULATORY CARE:   Cholesterol  is a waxy, fat-like substance. Your body uses cholesterol to make hormones and new cells, and to protect nerves. Cholesterol is made by your body. It also comes from certain foods you eat, such as meat and dairy products. Your healthcare provider can help you set goals for your cholesterol levels. Your provider can help you create a plan to meet your goals.  Cholesterol level goals:  Your cholesterol level goals depend on your risk for heart disease, your age, and your other health conditions. The following are general guidelines:  Total cholesterol  includes low-density lipoprotein (LDL), high-density lipoprotein (HDL), and triglyceride levels. The total cholesterol level should be lower than 200 mg/dL and is best at about 150 mg/dL.    LDL cholesterol  is called bad cholesterol  because it forms plaque in your arteries. As plaque builds up, your arteries become narrow, and less blood flows through. When plaque decreases blood flow to your heart, you may have chest pain. If plaque  completely blocks an artery that brings blood to your heart, you may have a heart attack. Plaque can break off and form blood clots. Blood clots may block arteries in your brain and cause a stroke. The level should be less than 130 mg/dL and is best at about 100 mg/dL.         HDL cholesterol  is called good cholesterol  because it helps remove LDL cholesterol from your arteries. It does this by attaching to LDL cholesterol and carrying it to your liver. Your liver breaks down LDL cholesterol so your body can get rid of it. High levels of HDL cholesterol can help prevent a heart attack and stroke. Low levels of HDL cholesterol can increase your risk for heart disease, heart attack, and stroke. The level should be at least 40 mg/dL in males or at least 50 mg/dL in females.    Triglycerides  are a type of fat that store energy from foods you eat. High levels of triglycerides also cause plaque buildup. This can increase your risk for a heart attack or stroke. If your triglyceride level is high, your LDL cholesterol level may also be high. The level should be less than 150 mg/dL.    Any of the following can increase your risk for high cholesterol:   Smoking or drinking large amounts of alcohol    Having overweight or obesity, or not getting enough exercise    A medical condition such as hypertension (high blood pressure) or diabetes    A family history of high cholesterol    Age older than 65    What you need to know about having your cholesterol levels checked:  Adults 20 to 45 years of age should have their cholesterol levels checked every 4 to 6 years. Adults 45 years or older should have their cholesterol checked every 1 to 2 years. You may need your cholesterol checked more often, or at a younger age, if you have risk factors for heart disease. You may also need to have your cholesterol checked more often if you have other health conditions, such as diabetes. Blood tests are used to check cholesterol levels. Blood  tests measure your levels of triglycerides, LDL cholesterol, and HDL cholesterol.  How healthy fats affect your cholesterol levels:  Healthy fats, also called unsaturated fats, help lower LDL cholesterol and triglyceride levels. Healthy fats include the following:  Monounsaturated fats  are found in foods such as olive oil, canola oil, avocado, nuts, and olives.    Polyunsaturated fats,  such as omega 3 fats, are found in fish, such as salmon, trout, and tuna. They can also be found in plant foods such as flaxseed, walnuts, and soybeans.    How unhealthy fats affect your cholesterol levels:  Unhealthy fats increase LDL cholesterol and triglyceride levels. They are found in foods high in cholesterol, saturated fat, and trans fat:  Cholesterol  is found in eggs, dairy, and meat.    Saturated fat  is found in butter, cheese, ice cream, whole milk, and coconut oil. Saturated fat is also found in meat, such as sausage, hot dogs, and bologna.    Trans fat  is found in liquid oils and is used in fried and baked foods. Foods that contain trans fats include chips, crackers, muffins, sweet rolls, microwave popcorn, and cookies.    Treatment  for high cholesterol will also decrease your risk of heart disease, heart attack, and stroke. Treatment may include any of the following:  Lifestyle changes  may include food, exercise, weight loss, and quitting smoking. You may also need to decrease the amount of alcohol you drink. Your healthcare provider will want you to start with lifestyle changes. Other treatment may be added if lifestyle changes are not enough. Your healthcare provider may recommend you work with a team to manage hyperlipidemia. The team may include medical experts such as a dietitian, an exercise or physical therapist, and a behavior therapist. Your family members may be included in helping you create lifestyle changes.    Medicines  may be given to lower your LDL cholesterol, triglyceride levels, or total  cholesterol level. You may need medicines to lower your cholesterol if any of the following is true:    You have a history of stroke, TIA, unstable angina, or a heart attack.    Your LDL cholesterol level is 190 mg/dL or higher.    You are age 40 to 75 years, have diabetes or heart disease risk factors, and your LDL cholesterol is 70 mg/dL or higher.    Supplements  include fish oil, red yeast rice, and garlic. Fish oil may help lower your triglyceride and LDL cholesterol levels. It may also increase your HDL cholesterol level. Red yeast rice may help decrease your total cholesterol level and LDL cholesterol level. Garlic may help lower your total cholesterol level. Do not take any supplements without talking to your healthcare provider.    Food changes you can make to lower your cholesterol levels:  A dietitian can help you create a healthy eating plan. Your dietitian can show you how to read food labels and choose foods low in saturated fat, trans fats, and cholesterol.     Decrease the total amount of fat you eat.  Choose lean meats, fat-free or 1% fat milk, and low-fat dairy products, such as yogurt and cheese. Try to limit or avoid red meats. Limit or do not eat fried foods or baked goods, such as cookies.    Replace unhealthy fats with healthy fats.  Cook foods in olive oil or canola oil. Choose soft margarines that are low in saturated fat and trans fat. Seeds, nuts, and avocados are other examples of healthy fats.    Eat foods with omega-3 fats.  Examples include salmon, tuna, mackerel, walnuts, and flaxseed. Eat fish 2 times per week. Pregnant women should not eat fish that have high levels of mercury, such as shark, swordfish, and maribel mackerel.         Increase the amount of high-fiber foods you eat.  High-fiber foods can help lower your LDL cholesterol. Aim to get between 20 and 30 grams of fiber each day. Fruits and vegetables are high in fiber. Eat at least 5 servings each day. Other high-fiber foods  are whole-grain or whole-wheat breads, pastas, or cereals, and brown rice. Eat 3 ounces of whole-grain foods each day. Increase fiber slowly. You may have abdominal discomfort, bloating, and gas if you add fiber to your diet too quickly.         Eat healthy protein foods.  Examples include low-fat dairy products, skinless chicken and turkey, fish, and nuts.    Limit foods and drinks that are high in sugar.  Your dietitian or healthcare provider can help you create daily limits for high-sugar foods and drinks. The limit may be lower if you have diabetes or another health condition. Limits can also help you lose weight if needed.  Lifestyle changes you can make to lower your cholesterol levels:   Maintain a healthy weight.  Ask your healthcare provider what a healthy weight is for you. Ask your provider to help you create a weight loss plan if needed. Weight loss can decrease your total cholesterol and triglyceride levels. Weight loss may also help keep your blood pressure at a healthy level.    Be physically active throughout the day.  Physical activity, such as exercise, can help lower your total cholesterol level and maintain a healthy weight. Physical activity can also help increase your HDL cholesterol level. Work with your healthcare provider to create an program that is right for you. Get at least 30 to 40 minutes of moderate physical activity most days of the week. Examples of exercise include brisk walking, swimming, or biking. Also include strength training at least 2 times each week. Your healthcare providers can help you create a physical activity plan.            Do not smoke.  Nicotine and other chemicals in cigarettes and cigars can raise your cholesterol levels. Ask your healthcare provider for information if you currently smoke and need help to quit. E-cigarettes or smokeless tobacco still contain nicotine. Talk to your healthcare provider before you use these products.         Limit or do not drink  alcohol.  Alcohol can increase your triglyceride levels. Ask your healthcare provider before you drink alcohol. Ask how much is okay for you to drink in 24 hours or 1 week.    Follow up with your doctor as directed:  Write down your questions so you remember to ask them during your visits.  © Copyright [employer] 2023 Information is for End User's use only and may not be sold, redistributed or otherwise used for commercial purposes.  The above information is an  only. It is not intended as medical advice for individual conditions or treatments. Talk to your doctor, nurse or pharmacist before following any medical regimen to see if it is safe and effective for you.

## 2024-02-16 NOTE — PROGRESS NOTES
Virtual Regular Visit    Verification of patient location:    Patient is located at Home in the following state in which I hold an active license [place]      Assessment/Plan:    Problem List Items Addressed This Visit        Cardiovascular and Mediastinum    Migraine     Improved.  Continue Maxalt 10 mg p.r.n.. Increase Cymbalta 90mg QD.      For headache and migraine prevention I would suggest a combination vitamin supplement which may include 1 or more of the following ingredients:  Magnesium 400 mg , Riboflavin (vitamin B2) 400 - 600 mg,  Butterbur 150 mg ([place] free). Other ingredients that may be helpful are feverfew, coenzyme Q10 100 mg three times a day,  melatonin and kiley.  Some example brands that combine some of these ingredients are Migravent or Dolovent.                Relevant Medications    DULoxetine (CYMBALTA) 30 mg delayed release capsule    DULoxetine (CYMBALTA) 60 mg delayed release capsule       Other    Anxiety - Primary     Improved.  Increase Cymbalta 90mg QD.  Continue Wellbutrin XL 300mg QD.  Continue counseling.           Relevant Medications    DULoxetine (CYMBALTA) 30 mg delayed release capsule    DULoxetine (CYMBALTA) 60 mg delayed release capsule    Depression     Improved.  Start Cymbalta 90mg QD.  Continue Wellbutrin XL 300mg QD.  Continue counseling.           Relevant Medications    DULoxetine (CYMBALTA) 30 mg delayed release capsule    DULoxetine (CYMBALTA) 60 mg delayed release capsule    Electronic cigarette use     Contemplative.  Electronic cigarette smoking cessation advised.         Medical marijuana use     Using for anxiety.  Electronic cigarette smoking cessation and instead topical / oral alternatives advised.              Depression Screening and Follow-up Plan: Patient's depression screening was positive with a PHQ-9 score of 10. Patient advised to follow-up with PCP for further management.         Reason for visit is   Chief Complaint   Patient presents with   • Follow-up      6 week Virtual - F/U Anx/Dep, Migraine, Vit D Def, ECig, Labs.  No new problems or concerns at this time. Labs not done.           Encounter provider Patricia Collado DO    Provider located at [facility]  [address]  [address]  [place] [id number] [phone]      Recent Visits  No visits were found meeting these conditions.  Showing recent visits within past 7 days and meeting all other requirements  Today's Visits  Date Type Provider Dept   24 Telephone Patricia Collado DO [facility]   24 Telemedicine Patricia Collado DO [facility]   Showing today's visits and meeting all other requirements  Future Appointments  No visits were found meeting these conditions.  Showing future appointments within next 150 days and meeting all other requirements       The patient was identified by name and date of birth. Anette Santiago was informed that this is a telemedicine visit and that the visit is being conducted through the Epic Embedded platform. She agrees to proceed..  My office door was closed. No one else was in the room.  She acknowledged consent and understanding of privacy and security of the video platform. The patient has agreed to participate and understands they can discontinue the visit at any time.    Patient is aware this is a billable service.     Subjective  Anette Santiago is a 43 y.o. female  .      HPI     Return in about 6 weeks (around 2024) for Virtual - F/U Anx/Dep, Migraine, Vit D Def, ECig, Labs.       Patient did not complete labs 23        HTN - Improved s/p ETOH cessation .  Previously on Metoprolol.  No BP check outside of office.  Watching diet.  Drinking 3-4 Ensure protein drinks daily due to lack of appetite since  when son Noman entered rehab, then  from drug overdose Summer 2022.  Eating 2 meals daily.  +Exercise - Walking for 45 minutes, 2-3 times per week.       Depression / Anxiety - On Cymbalta  60mg QD x 6 weeks.  Mood is worse to to pending menses, but otherwise mood has been more stable.  Feels mood is 60% improved.      She stopped taking Lexapro 10mg QD after a couple days due to fatigue.  s/p Partial Inpatient Psych 10/12/22 - 10/13/22 for 1 day, as she wanted to pursue individual therapy.   Mood worse since  when son Noman entered rehab, then  from drug overdose .  On Wellbutrin XL 300mg QD.  Poor social supports.  No SI/HI/AH/VH.  Feels safe at home.  Sees counselor via Zoom every other weekly - next appt 3/24.  Attends [employer] child loss support group every other week since , which has been helpful - next appt .  She has been self-isolating by choice as she no longer prefers group settings.  Last saw Psychiatrist in early  for anxiety.  Previously on Prozac, Zoloft, Paxil - unsure of why Rx was D/C, Lexapro D/C due to fatigue.  No higher dose of Wellbutrin previously.       PHQ-2/9 Depression Screening    Little interest or pleasure in doing things: 1 - several days  Feeling down, depressed, or hopeless: 2 - more than half the days  Trouble falling or staying asleep, or sleeping too much: 0 - not at all  Feeling tired or having little energy: 0 - not at all  Poor appetite or overeating: 3 - nearly every day  Feeling bad about yourself - or that you are a failure or have let yourself or your family down: 2 - more than half the days  Trouble concentrating on things, such as reading the newspaper or watching television: 1 - several days  Moving or speaking so slowly that other people could have noticed. Or the opposite - being so fidgety or restless that you have been moving around a lot more than usual: 1 - several days  Thoughts that you would be better off dead, or of hurting yourself in some way: 0 - not at all  PHQ-9 Score: 10  PHQ-9 Interpretation: Moderate depression       SEN-7 Flowsheet Screening    Flowsheet Row Most Recent Value   Over the last 2 weeks, how often have  you been bothered by any of the following problems?    Feeling nervous, anxious, or on edge 3   Not being able to stop or control worrying 3   Worrying too much about different things 3   Trouble relaxing 3   Being so restless that it is hard to sit still 3   Becoming easily annoyed or irritable 2   Feeling afraid as if something awful might happen 3   SEN-7 Total Score 20              H/O ETOH Abuse - In remission.  No longer attends AA once weekly.  No sponsor.  Never attended rehab.  Last drink .     Migraine s Aura - On Maxalt 10mg PRN.  Has HA prior to migraine.  Has associated nausea.  Occurs Less than 9 (previously 1-2) times per month (previously occurring every other month).  Occurs c menses.  Usually only need to take 1 Maxalt per daily x 2- 3 days.  She is taking 2-3 pills per month.  Previously on Imitrex - D/C due to chest pain.  No Neuro consult or brain imaging previously.        Hand Dermatitis - Stable.  Using Elocon PRN - every other week.        Vitamin D Deficiency - s/p Drisdol.  Taking MVI, but not OTC Vitamin D.      E- cigarette Smoker & Medial Marijuana - Contemplative.  Using Medical Marijuana vape at night for mood.  Advised non-inhalant forms of marijuana instead.             From previous note:    Return in 6 weeks (on 2023) for 40min  F/U Anx/Dep, Migraine, Vit D Def, ECig, Labs - No appt scheduled.     4mo OV - Overdue     Patient did not complete labs 23        HTN - Improved s/p ETOH cessation .  Previously on Metoprolol.  No BP check outside of office.  Watching diet.  Drinking 3-4 Ensure protein drinks daily due to lack of appetite since  when son Noman entered rehab, then  from drug overdose Summer 2022.  Eating 1-2 meals daily.  +Exercise - previously  Walking for 30 minutes, 2-3 times per week.       Depression / Anxiety - Mood improving after the holidays, 2nd holiday after sun's overdose death.  She stopped taking Lexapro 10mg QD after a couple days  due to fatigue.  s/p Partial Inpatient Psych 10/12/22 - 10/13/22 for 1 day, as she wanted to pursue individual therapy.   Mood worse since  when son Noman entered rehab, then  from drug overdose .  Decreased appetite.  On Wellbutrin XL 300mg QD.  Good social supports.  No SI/HI/AH/VH.  Feels safe at home.  Sees counselor via Zoom weekly - next appt .  Attends [employer] child loss support group every other week since , which has been helpful - next appt .  She has been self-isolating by choice as she no longer prefers group settings.  Last saw Psychiatrist in early  for anxiety.  Previously on Prozac, Zoloft, Paxil - unsure of why Rx was D/C, Lexapro D/C due to fatigue.  No higher dose of Wellbutrin previously.          PHQ-2/9 Depression Screening       Little interest or pleasure in doing things: 1 - several days  Feeling down, depressed, or hopeless: 2 - more than half the days  Trouble falling or staying asleep, or sleeping too much: 0 - not at all  Feeling tired or having little energy: 0 - not at all  Poor appetite or overeatin - more than half the days  Feeling bad about yourself - or that you are a failure or have let yourself or your family down: 1 - several days  Trouble concentrating on things, such as reading the newspaper or watching television: 1 - several days  Moving or speaking so slowly that other people could have noticed. Or the opposite - being so fidgety or restless that you have been moving around a lot more than usual: 1 - several days  Thoughts that you would be better off dead, or of hurting yourself in some way: 0 - not at all  PHQ-9 Score: 8  PHQ-9 Interpretation: Mild depression                SEN-7 Flowsheet Screening    Flowsheet Row Most Recent Value   Over the last 2 weeks, how often have you been bothered by any of the following problems?     Feeling nervous, anxious, or on edge 3   Not being able to stop or control worrying 3   Worrying too much about  different things 3   Trouble relaxing 2   Being so restless that it is hard to sit still 1   Becoming easily annoyed or irritable 1   Feeling afraid as if something awful might happen 3   SEN-7 Total Score 16                 H/O ETOH Abuse - In remission.  No longer attends AA once weekly.  No sponsor.  Never attended rehab.  Last drink 2019.     Migraine s Aura - On Maxalt 10mg PRN.  Has HA prior to migraine..  Has associated nausea.  Occurs 1-2 times per month (previously occurring every other month).  Occurs c menses.  Usually only need to take 1 Maxalt per daily x 2- 3 days.  She is taking 2-3 pills per month.  Previously on Imitrex - D/C due to chest pain.  No Neuro consult or brain imaging previously.        Hand Dermatitis - Stable.  Using Elocon PRN - every other week.        Vitamin D Deficiency - s/p Drisdol.  Taking MVI, but not OTC Vitamin D.      E- cigarette Smoker & Medial Marijuana - Contemplative.  Using Medical Marijuana vape at night for mood.  Advised non-inhalant forms of marijuana instead.          Reviewed:  Labs 10/18/22, Gyn 9/27/23, Chiro 11/28/22     Sees Gyn MsRosalia Ugalde PA-C at [facility].  Next appt 9/24     Sees Dentist q6 months.  Overdue for Optho.        Past Medical History:   Diagnosis Date   • Anxiety    • Chlamydia infection     diagnosed with chlamydia in January 2017. treated and has tested negative since   • Depression    • Electronic cigarette use    • Hand dermatitis    • History of alcohol abuse    • Hypertension    • Medical marijuana use 3/17/2023   • Migraine    • Vitamin D deficiency        Past Surgical History:   Procedure Laterality Date   • CHOLECYSTECTOMY     • TUBAL LIGATION     • WISDOM TOOTH EXTRACTION  03/21/1998       Current Outpatient Medications   Medication Sig Dispense Refill   • buPROPion (WELLBUTRIN XL) 300 mg 24 hr tablet Take 1 tablet (300 mg total) by mouth daily 90 tablet 0   • CANNABIDIOL PO Take by mouth Medical  Marijuana Vape nightly for anxiety     • DULoxetine (CYMBALTA) 30 mg delayed release capsule Take 1 capsule (30 mg total) by mouth daily Take with 60mg daily for a total of 90mg daily. 30 capsule 1   • DULoxetine (CYMBALTA) 60 mg delayed release capsule Take 1 capsule (60 mg total) by mouth daily Take with 30mg daily for a total of 90mg daily. 30 capsule 1   • multivitamin (THERAGRAN) TABS Take 1 tablet by mouth daily     • rizatriptan (MAXALT) 10 mg tablet TAKE 1 TABLET BY MOUTH DAILY AS NEEDED FOR MIGRAINE MAY REPEAT  IN 2 HOURS IF SYMPTOMS PERSIST. MAX TABLETS 3 PER 24 HOURS 9 tablet 0     No current facility-administered medications for this visit.        Allergies   Allergen Reactions   • Contrast [Iodinated Contrast Media] Itching     Throat Itching       Review of Systems   Constitutional:  Positive for appetite change (Improved). Negative for chills, diaphoresis, fatigue and fever.   Respiratory:  Negative for chest tightness and shortness of breath.    Cardiovascular:  Negative for chest pain.   Gastrointestinal:  Negative for abdominal pain, blood in stool, diarrhea, nausea and vomiting.   Genitourinary:  Negative for dysuria.   Neurological:  Negative for headaches.       Video Exam    Vitals:    02/22/24 1058   Weight: 52.2 kg (115 lb)       Physical Exam  Vitals and nursing note reviewed.   Constitutional:       General: She is not in acute distress.     Appearance: Normal appearance. She is well-developed and normal weight. She is not diaphoretic.   HENT:      Head: Normocephalic and atraumatic.      Right Ear: External ear normal.      Left Ear: External ear normal.      Nose: Nose normal.   Eyes:      General: No scleral icterus.        Right eye: No discharge.         Left eye: No discharge.      Extraocular Movements: Extraocular movements intact.      Conjunctiva/sclera: Conjunctivae normal.   Neck:      Thyroid: No thyromegaly.   Pulmonary:      Effort: Pulmonary effort is normal. No respiratory  distress.      Breath sounds: No stridor. No wheezing.   Musculoskeletal:         General: No swelling.      Cervical back: Normal range of motion.      Right lower leg: No edema.      Left lower leg: No edema.   Lymphadenopathy:      Cervical: No cervical adenopathy.   Skin:     Findings: No rash.   Neurological:      General: No focal deficit present.      Mental Status: She is alert and oriented to person, place, and time. Mental status is at baseline.   Psychiatric:         Mood and Affect: Mood normal.         Behavior: Behavior normal.         Thought Content: Thought content normal.         Judgment: Judgment normal.          Visit Time  Total Visit Duration: 18 minutes

## 2024-02-21 PROBLEM — Z01.419 ENCOUNTER FOR GYNECOLOGICAL EXAMINATION (GENERAL) (ROUTINE) WITHOUT ABNORMAL FINDINGS: Status: RESOLVED | Noted: 2020-10-15 | Resolved: 2024-02-21

## 2024-02-22 ENCOUNTER — TELEMEDICINE (OUTPATIENT)
Dept: FAMILY MEDICINE CLINIC | Facility: CLINIC | Age: 44
End: 2024-02-22
Payer: MEDICARE

## 2024-02-22 ENCOUNTER — TELEPHONE (OUTPATIENT)
Dept: FAMILY MEDICINE CLINIC | Facility: CLINIC | Age: 44
End: 2024-02-22

## 2024-02-22 ENCOUNTER — TELEPHONE (OUTPATIENT)
Age: 44
End: 2024-02-22

## 2024-02-22 VITALS — BODY MASS INDEX: 19.14 KG/M2 | WEIGHT: 115 LBS

## 2024-02-22 DIAGNOSIS — F41.9 ANXIETY: Primary | ICD-10-CM

## 2024-02-22 DIAGNOSIS — Z78.9 ELECTRONIC CIGARETTE USE: ICD-10-CM

## 2024-02-22 DIAGNOSIS — G43.009 MIGRAINE WITHOUT AURA AND WITHOUT STATUS MIGRAINOSUS, NOT INTRACTABLE: ICD-10-CM

## 2024-02-22 DIAGNOSIS — Z79.899 MEDICAL MARIJUANA USE: ICD-10-CM

## 2024-02-22 DIAGNOSIS — F33.2 SEVERE EPISODE OF RECURRENT MAJOR DEPRESSIVE DISORDER, WITHOUT PSYCHOTIC FEATURES (HCC): ICD-10-CM

## 2024-02-22 PROCEDURE — 99214 OFFICE O/P EST MOD 30 MIN: CPT | Performed by: FAMILY MEDICINE

## 2024-02-22 RX ORDER — DULOXETIN HYDROCHLORIDE 60 MG/1
60 CAPSULE, DELAYED RELEASE ORAL DAILY
Qty: 30 CAPSULE | Refills: 1 | Status: SHIPPED | OUTPATIENT
Start: 2024-02-22

## 2024-02-22 RX ORDER — DULOXETIN HYDROCHLORIDE 30 MG/1
30 CAPSULE, DELAYED RELEASE ORAL DAILY
Qty: 30 CAPSULE | Refills: 1 | Status: SHIPPED | OUTPATIENT
Start: 2024-02-22

## 2024-02-22 RX ORDER — RIZATRIPTAN BENZOATE 10 MG/1
TABLET ORAL
Qty: 9 TABLET | Refills: 0 | Status: CANCELLED | OUTPATIENT
Start: 2024-02-22

## 2024-02-22 NOTE — PATIENT INSTRUCTIONS
"  For headache and migraine prevention I would suggest a combination vitamin supplement which may include 1 or more of the following ingredients: Magnesium citrate 300 mg twice a day, Riboflavin (vitamin B2) 400 - 600 mg,  Butterbur 150 mg ([place] free). Other ingredients that may be helpful are feverfew, coenzyme Q10 100 mg three times a day,  melatonin and kiley.  Some example brands that combine some of these ingredients are Migravent or Dolovent.           Refer to the back of insurance card for counseling options.    Apps and Websites for Counseling, Anxiety/Depression, Chronic Pain, Lifestyle Change, Problem-solving, Self-Esteem, Anger Management, Coping with Uncertainty    (Prices current as of 9/5/19)    Mood Kit - Available in Apple Sera Store for $4.99.  Supports a person's success in specific situations, includes thought , mood tracker, and journal.  Can be accessed in an unstructured way and used as an unguided self-help sera.      2.  Moodnotes - Available in Apple Sera Store for $4.99.  Focuses on healthier thinking habits and identifying \"traps\" in thinking.  Tracks mood over a period of time and identifies factors that influence mood.      3.  MoodMission - Available in Apple Sera Store and Google Play for free.  Includes five \"missions\" to promote confidence in handling stressors and coping in specific situations.  The sera personalizes its style and techniques based on when the user engages it most frequently.  In-sera rewards are used to motivate, increase fun and self-confidence.  Helpful for patients who need improvement in mood or a decrease in anxiety and depression symptoms.      4.   What's Up - Available in Apple Srea Store and Google Play for free.  Recognizes negative thinking patterns and methods to overcome them.  Uses helpful metaphors, a catastrophe scale, grounding techniques, and breathing exercises.  Has the ability to sync data across multiple devices and protect this information " with a unique pass code.  Has the capability to be active in forums where people can discuss similar feelings and strategies that have been helpful.      5.  Moodpath - Available in Apple Sera Store and Google Play for free.  Uses daily screenings to create a better understanding of thoughts, feelings, and emotions.  When needed, it provides a discussion guide to talking with a medical professional based on the responses to daily screenings.  Includes over 150 psychological exercises and videos to promote and strengthen overall mental health.    6.  MindShift - Available in Apple Sera Store and Google Play for free.  Helpful for youth and young adults in coping with anxiety.  Creates an individualized toolbox to help users deal with test anxiety, perfectionism, social anxiety, worry, panic, and conflict.  Includes directions on how to construct “belief experiments” to trial common beliefs that feed anxiety, guided relaxation, as well as tools and tips to help establish and accomplish goals.  Differentiates between helpful and unhelpful anxiety, and explains how to overcome fears by gradually facing them in manageable steps.    7.  CBT-I  - Available in Apple Sera Store and Google Play for free.  For insomnia.  Educates about sleep, developing positive sleep routines, and improved sleep environment.  Encourages user to change behaviors which will provide confidence for better sleep on a regular basis.    8.  Nortisco.uk - Free website.  Provides self-help and therapy resources that encourages change to combat negative and destructive thought patterns.  Includes access to numerous handouts on a variety of symptoms related to anxiety, depression, low self-esteem, panic attacks, social disorders, and more.  The solution section has interventions that can be printed and saved for future use.    9.  Woebot - Available in Apple Sera store and Google Play for free.  Recommended for age 17+.  Friendly self-care   "that helps you think through situations with step-by-step guidance using counseling tools, learn about yourself with intelligent mood tracking, and master skills to reduce stress and live happier through 100+ evidence-based stories from clinicians.  Chat as often or as little as you'd like, whenever you'd like to.      10.  [place]y:  AI  CBT DBT Chatbot - Available in Apple appsstore and Google Play for free, has in-sera purchases.  Recommended for 12+.  Psychologist support at $30/month, 50% off to start.  AI  / chatbot is free.  Uses techniques of CBT, DBT, yoga, and meditation to support your needs regarding stress, anxiety, sleep, loss, and a full range of other mental health and wellness issues.      11.  Curable Pain Relief - Available in Apple Sera store and Google Play for free, has in-sera purchases.   Teaches about the chronic pain cycle and how to reverse it, while retraining your brain and nervous system for long-term results.  Smart  Alyne guides user through updates in pain science to identify, target, eliminate the factors that are keeping user \"stuck\" in the pain cycle.      12.  [facility] - Available in Apple Sera store and Google Play for a fee.  Subscription service, starting at $59/week (billed monthly).  All plans include unlimited messaging, and add live video sessions if desired.  Unlimited messaging therapy for teens ages 13-17 and special services for couples therapy.  You can change therapists or stop subscription renewal at any time.  Recommended for 13+.  User is matched with a licensed therapist in your state and communicate on your device by text, audio, and video from anywhere, at any time.  User will hear back at least once a day, 5 days per week.      Counseling services:    Go to [facility] to find a therapist near you. You can sort by gender, insurance, issues, etc.     [facility]   [address], [address], [place], [place] " [address] [phone]    [facility] (they have equine therapy too)  [address], [address],  [place] [address]  [address],  [place], [place] [id number] [phone]     [facility]   [address]; [place], [place] [address]   [phone]     [facility]  [address], [address]  [place], [place] [address]    [phone]     [facility]   [address] [place] [address]     [phone]     [facility]   [address], [place]   [phone]    [facility]  [address] [place] [address]  [phone]    [facility]   [address]   [place] [address]  [email or web address]    CLAIRE Scherer, LSW  (patients age 11-adult)   [address], [place], [place] [address]   [phone]     [facility]  [address]   [place] [address]  [phone]     Sana Jensen, FAITHW  [address], [place] [address] [phone]     [facility] (specializing in addiction)  [address] [place] [address]  [phone]    Treva Elaine  [address], [place] [address]   [phone]      Hailee Camargo, yD  [address], [place], [place] [address]   [phone]      Deysi White, MS, LPC, NCC  [address], [place] [address]  [phone]     Madison Berrios, PsyD  [address] [place] [address]   [phone]    [facility]   [address] [place] [place] [address] [phone]      Hotlines:   Please program these numbers into your phone in case you or someone you know needs them. All services are free.     988  People who call, text, or chat with 058 will be directly connected to the National Suicide Prevention Lifeline.  The existing Lifeline phone number (1-980.727.6978) will remain available. Callers can also connect with the [facility] Crisis Line or assistance in Guyanese. 089 can be used by anyone, any time, at no cost. Trained crisis response professionals can support individuals considering suicide, self-harm, or any behavioral, substance abuse or misuse or mental health need for themselves or people looking for help for a loved one experiencing a mental health crisis. Lifeline services are available 24 hours a day, seven days a week at no cost to the caller.  Crisis Text Line: text 521037     You are connected to a Crisis Counselor to bring a hot moment to a cool calm through active listening and collaborative problem solving.   WarmLine:  791.184.3630 or 416-623-6386   They provide a supportive listening ear if you need it. They also can also provide information about mental health concerns and services.   Crisis Line:  [facility] [phone],  [facility] [phone], [facility]: [phone]   24/7 crisis counseling, on-site counseling and walk-in counseling services available.   National Suicide Prevention Lifeline:  1-566.885.8331.  En [place]ol Nacional de Prevencion del Suicidio 1-923.721.4118   This is free, confidential, and available 24/7.   [place]: [phone]   For those facing or having survived abuse 24 hour confidential help line, emergency safe house, counseling, advocacy and education.    If you or someone your know are feeling as though you are going to hurt yourself, do not wait - GET HELP RIGHT AWAY.  Go to the closest Emergency Room, call 911, or call someone you trust, family member or friend to be with you until you can get some help.

## 2024-02-22 NOTE — TELEPHONE ENCOUNTER
Patient called in stating she received a call from Dr. Collado to start her virtual appt earlier. Spoke with Roc and confirmed with Dr. Collado and patient is okay to start appt earlier than scheduled. NFA needed at this time.

## 2024-02-22 NOTE — ASSESSMENT & PLAN NOTE
Improved.  Continue Maxalt 10 mg p.r.n.. Increase Cymbalta 90mg QD.      For headache and migraine prevention I would suggest a combination vitamin supplement which may include 1 or more of the following ingredients:  Magnesium 400 mg , Riboflavin (vitamin B2) 400 - 600 mg,  Butterbur 150 mg ([place] free). Other ingredients that may be helpful are feverfew, coenzyme Q10 100 mg three times a day,  melatonin and kiley.  Some example brands that combine some of these ingredients are Migravent or Dolovent.

## 2024-02-22 NOTE — TELEPHONE ENCOUNTER
Lvm for patient asking them to return office call to set up virtual follow up in 6 weeks. Notified patient lab order were placed in the mail to her.

## 2024-02-28 DIAGNOSIS — G43.009 MIGRAINE WITHOUT AURA AND WITHOUT STATUS MIGRAINOSUS, NOT INTRACTABLE: ICD-10-CM

## 2024-02-28 RX ORDER — RIZATRIPTAN BENZOATE 10 MG/1
TABLET ORAL
Qty: 9 TABLET | Refills: 3 | Status: SHIPPED | OUTPATIENT
Start: 2024-02-28

## 2024-03-11 ENCOUNTER — OFFICE VISIT (OUTPATIENT)
Dept: FAMILY MEDICINE CLINIC | Facility: CLINIC | Age: 44
End: 2024-03-11
Payer: MEDICARE

## 2024-03-11 VITALS
DIASTOLIC BLOOD PRESSURE: 82 MMHG | OXYGEN SATURATION: 100 % | SYSTOLIC BLOOD PRESSURE: 122 MMHG | RESPIRATION RATE: 16 BRPM | BODY MASS INDEX: 20.73 KG/M2 | HEIGHT: 65 IN | WEIGHT: 124.4 LBS | HEART RATE: 86 BPM

## 2024-03-11 DIAGNOSIS — L30.9 ECZEMA, UNSPECIFIED TYPE: Primary | ICD-10-CM

## 2024-03-11 PROCEDURE — 99213 OFFICE O/P EST LOW 20 MIN: CPT | Performed by: FAMILY MEDICINE

## 2024-03-11 RX ORDER — CLOBETASOL PROPIONATE 0.5 MG/G
CREAM TOPICAL 2 TIMES DAILY PRN
Qty: 30 G | Refills: 0 | Status: SHIPPED | OUTPATIENT
Start: 2024-03-11

## 2024-03-11 NOTE — PROGRESS NOTES
"Assessment/Plan:       Problem List Items Addressed This Visit    None  Visit Diagnoses     Eczema, unspecified type    -  Primary    clobetasol sparingly to affected area of pinna    Relevant Medications    clobetasol (TEMOVATE) 0.05 % cream                   Subjective:     Anette is a 43 y.o. female here today with chief complaint below:  Chief Complaint   Patient presents with   • Earache     L ear, pain and itching. Started a few weeks ago. Has eczema. Using hydrocortisone cream which is not helping.     - CC above per clinical staff and reviewed.    HPI:  L ear itchiness over the past month  Feels dry, flaky  Using hydrocortisone cream without improvement.   No fevers or cold symptoms with it.     A little dryness/irritation at corners of nares      The following portions of the patient's history were reviewed and updated as appropriate: allergies, current medications, past family history, past medical history, past social history, past surgical history and problem list.    ROS:  Review of Systems     Objective:      /82   Pulse 86   Resp 16   Ht 5' 5\" (1.651 m)   Wt 56.4 kg (124 lb 6.4 oz)   SpO2 100%   BMI 20.70 kg/m²   BP Readings from Last 3 Encounters:   03/11/24 122/82   01/11/24 106/70   09/27/23 108/80     Wt Readings from Last 3 Encounters:   03/11/24 56.4 kg (124 lb 6.4 oz)   02/22/24 52.2 kg (115 lb)   01/11/24 52.9 kg (116 lb 9.6 oz)               Physical Exam:   Physical Exam  Vitals and nursing note reviewed.   Constitutional:       Appearance: Normal appearance. She is not ill-appearing.   HENT:      Head: Normocephalic and atraumatic.      Right Ear: Tympanic membrane and ear canal normal.      Left Ear: Tympanic membrane and ear canal normal.      Ears:      Comments: R pinna with small patch of dryness, flaking.  Canal not affected  Lymphadenopathy:      Cervical: No cervical adenopathy.   Neurological:      Mental Status: She is alert.                "

## 2024-03-11 NOTE — PATIENT INSTRUCTIONS
For the edges of the nose- a little lotrimin (clotrimazole) cream followed by aquaphor  For the L ear-

## 2024-04-04 ENCOUNTER — NURSE TRIAGE (OUTPATIENT)
Age: 44
End: 2024-04-04

## 2024-04-04 NOTE — TELEPHONE ENCOUNTER
Regarding: Possible yeast infection  ----- Message from Arely Anguiano sent at 4/4/2024 12:43 PM EDT -----  Pt wanted to know if she had to be seen or can she have something called in thinks she may have possible yeast infection due to a lot of symptoms and having them in the past. Last seen in Sept 2023. Asked to please call her back.

## 2024-04-15 ENCOUNTER — OFFICE VISIT (OUTPATIENT)
Dept: FAMILY MEDICINE CLINIC | Facility: CLINIC | Age: 44
End: 2024-04-15
Payer: MEDICARE

## 2024-04-15 VITALS
TEMPERATURE: 98 F | WEIGHT: 122 LBS | HEIGHT: 65 IN | BODY MASS INDEX: 20.33 KG/M2 | HEART RATE: 72 BPM | SYSTOLIC BLOOD PRESSURE: 102 MMHG | OXYGEN SATURATION: 100 % | DIASTOLIC BLOOD PRESSURE: 70 MMHG | RESPIRATION RATE: 16 BRPM

## 2024-04-15 DIAGNOSIS — R21 RASH OF FACE: Primary | ICD-10-CM

## 2024-04-15 PROCEDURE — 99213 OFFICE O/P EST LOW 20 MIN: CPT | Performed by: FAMILY MEDICINE

## 2024-04-15 RX ORDER — TACROLIMUS 1 MG/G
OINTMENT TOPICAL 2 TIMES DAILY
Qty: 30 G | Refills: 0 | Status: SHIPPED | OUTPATIENT
Start: 2024-04-15

## 2024-04-15 RX ORDER — PIMECROLIMUS 10 MG/G
CREAM TOPICAL 2 TIMES DAILY
Qty: 30 G | Refills: 0 | Status: SHIPPED | OUTPATIENT
Start: 2024-04-15 | End: 2024-04-15

## 2024-04-15 NOTE — PROGRESS NOTES
"Assessment/Plan:       Problem List Items Addressed This Visit    None  Visit Diagnoses     Rash of face    -  Primary    stop neosporin and aquaphor (concern for sensitivity)  start protopic bid - if not improving in 7-10 days, reach out and will refer to dermatology    Relevant Medications    tacrolimus (PROTOPIC) 0.1 % ointment               Subjective:     Anette is a 44 y.o. female here today with chief complaint below:  Chief Complaint   Patient presents with   • Rash     B/l nose, dryness/peeling. Started 2 mo ago. Pt using some neosporin, Aquaphor, lotrimin.     - CC above per clinical staff and reviewed.    HPI:  Pt here with rash around nose x 2 months or so.  Seemed to maybe start when she had a cold two months ago, but that only lasted a few days.    Using hydrocortisone, neosporin, aquaphor- only helps for a few hours, then feels dry and itchy again.  Today not as bad.   Yesterday more red and noticeable.   Gets dry and flaky, but not crusty.  No oozing.     Tried lotrimin for two weeks without relief.          The following portions of the patient's history were reviewed and updated as appropriate: allergies, current medications, past family history, past medical history, past social history, past surgical history and problem list.    ROS:  Review of Systems       Objective:      /70   Pulse 72   Temp 98 °F (36.7 °C) (Temporal)   Resp 16   Ht 5' 5\" (1.651 m)   Wt 55.3 kg (122 lb)   SpO2 100%   BMI 20.30 kg/m²   BP Readings from Last 3 Encounters:   04/15/24 102/70   03/11/24 122/82   01/11/24 106/70     Wt Readings from Last 3 Encounters:   04/15/24 55.3 kg (122 lb)   03/11/24 56.4 kg (124 lb 6.4 oz)   02/22/24 52.2 kg (115 lb)               Physical Exam:   Physical Exam  Vitals and nursing note reviewed.   Constitutional:       Appearance: Normal appearance.   HENT:      Nose: No congestion.      Mouth/Throat:      Mouth: Mucous membranes are moist.   Lymphadenopathy:      Cervical: No " cervical adenopathy.   Skin:     Comments: Mild dryness and erythema just below nares   Neurological:      Mental Status: She is alert.

## 2024-04-22 DIAGNOSIS — F33.2 SEVERE EPISODE OF RECURRENT MAJOR DEPRESSIVE DISORDER, WITHOUT PSYCHOTIC FEATURES (HCC): ICD-10-CM

## 2024-04-22 RX ORDER — BUPROPION HYDROCHLORIDE 300 MG/1
300 TABLET ORAL DAILY
Qty: 90 TABLET | Refills: 1 | Status: SHIPPED | OUTPATIENT
Start: 2024-04-22

## 2024-07-09 ENCOUNTER — TELEPHONE (OUTPATIENT)
Age: 44
End: 2024-07-09

## 2024-07-09 NOTE — TELEPHONE ENCOUNTER
[employer] called with auth info for chiro treatment   Auth#[id number] 10 vts 7/11//24, also notated on appt line

## 2024-07-11 ENCOUNTER — OFFICE VISIT (OUTPATIENT)
Age: 44
End: 2024-07-11
Payer: MEDICARE

## 2024-07-11 VITALS
HEART RATE: 67 BPM | BODY MASS INDEX: 19.73 KG/M2 | OXYGEN SATURATION: 97 % | DIASTOLIC BLOOD PRESSURE: 70 MMHG | HEIGHT: 65 IN | SYSTOLIC BLOOD PRESSURE: 112 MMHG | WEIGHT: 118.4 LBS

## 2024-07-11 DIAGNOSIS — M79.10 MYALGIA: ICD-10-CM

## 2024-07-11 DIAGNOSIS — M54.6 CHRONIC LEFT-SIDED THORACIC BACK PAIN: ICD-10-CM

## 2024-07-11 DIAGNOSIS — M99.03 SEGMENTAL DYSFUNCTION OF LUMBAR REGION: Primary | ICD-10-CM

## 2024-07-11 DIAGNOSIS — G89.29 CHRONIC LEFT-SIDED THORACIC BACK PAIN: ICD-10-CM

## 2024-07-11 DIAGNOSIS — M99.02 SEGMENTAL DYSFUNCTION OF THORACIC REGION: ICD-10-CM

## 2024-07-11 DIAGNOSIS — M99.04 SACROILIAC JOINT SOMATIC DYSFUNCTION: ICD-10-CM

## 2024-07-11 DIAGNOSIS — M54.16 LEFT LUMBAR RADICULITIS: ICD-10-CM

## 2024-07-11 PROBLEM — M79.18 MYOFASCIAL PAIN ON LEFT SIDE: Status: ACTIVE | Noted: 2024-07-11

## 2024-07-11 PROCEDURE — 99203 OFFICE O/P NEW LOW 30 MIN: CPT | Performed by: CHIROPRACTOR

## 2024-07-11 NOTE — PROGRESS NOTES
Initial chiropractic visit--July 11, 2024, visit #1    Acute corrective treatment    1. Segmental dysfunction of lumbar region        2. Left lumbar radiculitis        3. Segmental dysfunction of thoracic region        4. Chronic left-sided thoracic back pain        5. Sacroiliac joint somatic dysfunction        6. Myalgia          Assessment/Plan:    Review of Diagnostic Studies and Office Notes:    The patient's August 17, 2016 CT scan of the head, February 18, 2023 mammogram screening (normal mammogram), August 17, 2016 x-ray report of the chest, Lynne maria t [facility] e-visit note (UTI symptoms), Yany Johnson MD April 15, 2024 Family medicine office note (rash to face), February 22, 2024 telemedicine office note (anxiety, migraine without aura or without status migrainous, medical marijuana, has had a prior to migraine, associated nausea, occurs less than 9 (previously 1-2) times per month, takes 1 Maxalt daily for 2 to 3 days, previously on Imitrex, discontinued due to chest pain, no neuro consult or brain imaging previously) were reviewed prior to the chiropractic evaluation today.    Decision and Treatment Plan:    Patient has symptoms on the course of the left L5 dermatome as it affects the left superior lateral gluteal region and left dorsal foot.  The patient complains of left superior lateral gluteal/hip pain that is nonreproducible with hip orthopedic testing.  However patient complains of intermittent paresthesias in the dorsum of the digits of the left foot and had positive nerve root tension testing on the left. The patient has myofascial findings as well as thoracic, lumbar, and left pelvic facet restrictions which can contribute to limited range of motion and pain.  If the patient is not making adequate progress she will be referred for x-rays of the pelvic/hip or lumbopelvic region.  The patient will be treated with conservative chiropractic care including myofascial release, joint  mobilization, and a home stretching and icing program.    The patient was taught gluteus medius/piriformis (assisted in unassisted gluteus medius/piriformis stretches) lower thoracic/thoracolumbar cat  stretches today. The patient was advised to do the stretch for 3 sets of 15-20 seconds several times per day.The need to stretch to the point of tension only was discussed. ROM was measured with an PerBlue digital dual inclinometer.    The patient will initially be seen 2 times per week and then 1 time per week as there are decreased symptoms and improvement in objective findings. The patient will then be discharged.    Patient Instructions:    The patient was advised to apply ice to the region in 15-minute intervals a minimum of 3 times per day. Otherwise progress will be slowed.   The patient was advised to do the stretching exercises a minimum of 3 times per day. Otherwise progress will be slowed. The need to stretch to the point of tension only was emphasized.   The patient was advised to avoid sleeping in the prone position and left side lying positions. Proper sleeping postures and use of pillows were discussed.    Goals:    JMLGOALS: Improve patient's ability to tolerate prolonged walking, prolonged physical activity, and prolonged sitting.    Time/Reviewed with Patient:    At least 30 minutes of time was spent with the patient during the consultation including the patient's history/current complaints, physical exam, reviewing the diagnostic report, reviewing home instruction/reducing risk factors with the patient, reviewing my findings with the patient, and discussing the treatment with the patient.    Subjective:      Anette Santiago is a 44 y.o. female who presents today with pain in the left mid to lower thoracic region.  The patient reported the symptoms began approximately 2 to 3 years ago and have been intermittently present since.  She reported there are periods of time when her symptoms in the left mid to  "lower thoracic region are absent.  However she stated the symptoms in the left mid to lower thoracic region have been more pronounced over the past month.  She indicated the pain is \"very specific) and worse with prolonged sitting while working on the computer.  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  She also reported having increased symptoms with prolonged walking and prolonged physical activity.  She stated she tends to feel better in the morning hours and worse in the mid to lower thoracic symptoms in the evening and night.  The patient stated she typically needs to go to bed at night because she is experiencing more pronounced pain in the left mid to lower thoracic region.  Currently she describes her pain level as a 3 on a 0-10 pain scale today.  When questioned the patient confirmed experiencing my minor discomfort in the left mid to lower thoracic region at times of taking deep breaths.  The patient also describes experiencing pain in the left superior lateral gluteal/hip region that has also been intermittently present over the past 2 to 3 years.  The patient denied experiencing pain radiating from the left superior lateral gluteal region/hip region into the left lower extremity.  However she describes experiencing intermittent paresthesias in the dorsal aspect of the digits of the left foot.  The patient stated that she may be aware of paresthesias in the dorsal aspect of the digits of the left foot when she has her left lower leg extended and reaches down to her toes and feels a \"pulling, stretching\" in the left lower extremity.  She denied having changes in bowel or bladder function.  The patient denied experiencing pain in the lower back.         Objective:  Vitals:    07/11/24 1053   BP: 112/70   Pulse: 67   SpO2: 97%   Weight: 53.7 kg (118 lb 6.4 oz)   Height: 5' 5\" (1.651 m)     Appearance: Well developed, well nourished, and in no acute distress    Alert and oriented x 3    Mood/Affect: calm and " pleasant    Gait/Posture:    Gait: Normal    Antalgic posture: None    Lordosis: Cervical- decreased    Lordosis: Lumbar- normal    Kyphosis: Thoracic- normal    Upper extremity reflexes:    Deep tendon reflexes were normal and symmetrical in the upper extremities.    Upper Extremity Muscle Testing:    Muscle testing of the bilateral upper extremities was normal and graded +5/5.    Sensory:    Sensation to light touch was normal and symmetrical in the bilateral thoracic dermatomes (area of chief complaint)      Orthopedic Tests:    Maximal Foraminal Compression was was negative  bilaterally.    Lisa-Mathur/Lindners/Brudzinsky: negative     Lower extremity reflexes:    Deep tendon reflexes were normal and symmetrical in the bilateral lower extremities.    Lower Extremity Muscle Testing:    Muscle testing of the bilateral lower extremities was normal and graded +5/5.    Sensory:    Sensation to light touch was normal and symmetrical in the bilateral lower extremities.    Babinski was negative bilaterally.    Lumbar Orthopedic Tests:    Seated Straight Leg Raise was negative  on the Right.    Seated Straight Leg Raise was positive on the Left for left posterior thigh tension and superior lateral gluteal/hip tension.    Bragard's Test was positive on left for increased left posterior thigh tension    Fred Test was positive bilaterally.    Hip evaluation:    Passive internal rotation of the Right/left: left femoroacetabular joint was negative for limited range of motion reproduction of pain.    Passive external rotation of the Right/left: left femoroacetabular joint was mildly restricted from external rotation with mild tension/stiffness reported.    Active Lumbar Ranges of Motion:    Flexion: Allow the patient to touch the proximal to mid tibia    Extension: 20 degrees    Right lateral flexion: 25 degrees    Left Lateral flexion: 24 degrees    Palpation:    [facility].  Active myofascial trigger  points, hypertonicity, and tenderness were present in the left mid to lower thoracic erector spinae, left gluteus medius, left tensor fascia gal, and left piriformis. Pressure to the above listed trigger points reproduced some of the pain described in today's chief complaint. Mild hypertonicity but no tenderness was noted in the left lumbar erector spinae and quadratus lumborum.  An L2 right vertebral body malposition subluxation and a L5 left vertebral body malposition subluxation were present.  T8,T9, and T10 vertebral body malposition subluxations were present.  A left anterior inferior sacral subluxation was present.  Hypertensin tenderness are present in the left T8, T9, and T10 paraspinal muscles, left L5 and left gluteus medius adjacent to the left posterior sacroiliac joint         Dictation voice to text software has been used in the creation of this document. Please consider this in light of any contextual or grammatical errors.

## 2024-07-11 NOTE — PATIENT INSTRUCTIONS
The patient was advised to apply ice to the region in 15-minute intervals a minimum of 3 times per day. Otherwise progress will be slowed.   The patient was advised to do the stretching exercises a minimum of 3 times per day. Otherwise progress will be slowed. The need to stretch to the point of tension only was emphasized.   The patient was advised to avoid sleeping in the prone position and left side. Proper sleeping postures and use of pillows were discussed.

## 2024-07-15 ENCOUNTER — TELEPHONE (OUTPATIENT)
Dept: OBGYN CLINIC | Facility: HOSPITAL | Age: 44
End: 2024-07-15

## 2024-07-15 NOTE — TELEPHONE ENCOUNTER
I called the pt and left a voicemail seeing if she was still coming to her 8am appt and if not, I informed her that there was other availability today and she could reschedule.

## 2024-09-06 ENCOUNTER — TELEPHONE (OUTPATIENT)
Age: 44
End: 2024-09-06

## 2024-09-06 DIAGNOSIS — Z11.1 TUBERCULOSIS SCREENING: Primary | ICD-10-CM

## 2024-09-10 ENCOUNTER — OFFICE VISIT (OUTPATIENT)
Dept: FAMILY MEDICINE CLINIC | Facility: CLINIC | Age: 44
End: 2024-09-10
Payer: MEDICARE

## 2024-09-10 ENCOUNTER — TELEPHONE (OUTPATIENT)
Dept: FAMILY MEDICINE CLINIC | Facility: CLINIC | Age: 44
End: 2024-09-10

## 2024-09-10 VITALS
HEART RATE: 69 BPM | WEIGHT: 111.8 LBS | TEMPERATURE: 97.9 F | HEIGHT: 65 IN | BODY MASS INDEX: 18.63 KG/M2 | SYSTOLIC BLOOD PRESSURE: 100 MMHG | DIASTOLIC BLOOD PRESSURE: 66 MMHG | OXYGEN SATURATION: 98 % | RESPIRATION RATE: 16 BRPM

## 2024-09-10 DIAGNOSIS — E55.9 VITAMIN D DEFICIENCY: ICD-10-CM

## 2024-09-10 DIAGNOSIS — F41.9 ANXIETY: Primary | ICD-10-CM

## 2024-09-10 DIAGNOSIS — Z78.9 ELECTRONIC CIGARETTE USE: ICD-10-CM

## 2024-09-10 DIAGNOSIS — G43.009 MIGRAINE WITHOUT AURA AND WITHOUT STATUS MIGRAINOSUS, NOT INTRACTABLE: ICD-10-CM

## 2024-09-10 DIAGNOSIS — Z79.899 MEDICAL MARIJUANA USE: ICD-10-CM

## 2024-09-10 DIAGNOSIS — F10.11 HISTORY OF ALCOHOL ABUSE: ICD-10-CM

## 2024-09-10 DIAGNOSIS — F33.2 SEVERE EPISODE OF RECURRENT MAJOR DEPRESSIVE DISORDER, WITHOUT PSYCHOTIC FEATURES (HCC): ICD-10-CM

## 2024-09-10 DIAGNOSIS — Z13.1 SCREENING FOR DIABETES MELLITUS: ICD-10-CM

## 2024-09-10 DIAGNOSIS — Z11.1 TUBERCULOSIS SCREENING: ICD-10-CM

## 2024-09-10 DIAGNOSIS — L30.9 HAND DERMATITIS: ICD-10-CM

## 2024-09-10 DIAGNOSIS — Z13.6 SCREENING FOR CARDIOVASCULAR CONDITION: ICD-10-CM

## 2024-09-10 PROCEDURE — 99214 OFFICE O/P EST MOD 30 MIN: CPT | Performed by: FAMILY MEDICINE

## 2024-09-10 PROCEDURE — 86580 TB INTRADERMAL TEST: CPT

## 2024-09-10 NOTE — TELEPHONE ENCOUNTER
Pt brought form in to OV today. Had PPD placement at appt. Coming back on 9/13 in the morning for PPD read, vision, and hearing screen. Form to be completed at that time for pt to take with her. Form placed in 's in progress folder.

## 2024-09-10 NOTE — PROGRESS NOTES
Assessment/Plan:  Problem List Items Addressed This Visit          Cardiovascular and Mediastinum    Migraine     Stable.  Continue Maxalt 10 mg p.r.n..     For headache and migraine prevention I would suggest a combination vitamin supplement which may include 1 or more of the following ingredients:  Magnesium 400 mg , Riboflavin (vitamin B2) 400 - 600 mg,  Butterbur 150 mg (PA free). Other ingredients that may be helpful are feverfew, coenzyme Q10 100 mg three times a day,  melatonin and kiley.  Some example brands that combine some of these ingredients are Migravent or Dolovent.                Relevant Orders    Magnesium       Musculoskeletal and Integument    Hand dermatitis     Stable on Elocon PRN.              Behavioral Health    Anxiety - Primary     Stable.  Continue Wellbutrin XL 300mg QD.  Continue counseling.           Relevant Orders    CBC and differential    Comprehensive metabolic panel    TSH, 3rd generation with Free T4 reflex    Depression     Stable.  Continue Wellbutrin XL 300mg QD.  Continue counseling.           Electronic cigarette use     Contemplative.  Electronic cigarette smoking cessation advised.         History of alcohol abuse     In remission.  No longer attending AA.         Medical marijuana use     Using for anxiety.  Electronic cigarette smoking cessation and instead topical / oral alternatives advised.            Other    Vitamin D deficiency     Pending labs.  Continue MVI.           Relevant Orders    Comprehensive metabolic panel    Vitamin D 25 hydroxy     Other Visit Diagnoses       Tuberculosis screening        Relevant Orders    TB Skin Test (Completed)    Screening for diabetes mellitus        Relevant Orders    Hemoglobin A1C    Screening for cardiovascular condition        Relevant Orders    CBC and differential    Comprehensive metabolic panel    Lipid panel    LDL cholesterol, direct             Return in about 4 months (around 1/10/2025) for Physical / 4mo-  Anx/Dep, Migraine, Vit D Def, ECig, Labs; 48-72 Hr PPD Read.      Future Appointments   Date Time Provider Department Center   2025  8:00 AM Patricia Collado,  [facility]        Subjective:     Anette is a 44 y.o. female who presents today for a follow-up on her chronic medical conditions.        HPI:  Chief Complaint   Patient presents with    Follow-up     Overdue F/U Anx/Dep, Migraine, Vit D Def, ECig, Labs. No new problems or concerns at this time. Has a physical form for work.      -- Above per clinical staff and reviewed. --      HPI      Today:        Return in about 6 weeks (around 2024) for Virtual - F/U Anx/Dep, Migraine, Vit D Def, ECig, Labs    F/U - Overdue        Patient did not complete labs 23    Underweight -      HTN - Improved s/p ETOH cessation .  Previously on Metoprolol.  No BP check outside of office.  Watching diet.  Eating 3-4 meals daily with protein.   when tylor Tineo entered rehab, then  from drug overdose Summer 2022.  Eating 3 meals daily.  +Exercise - Walking for 45 minutes, 3-4 times per week.       Depression / Anxiety -  Mood is stable.  She never took Cymbalta 90mg QD.  Mood is worse to to pending menses, but otherwise mood has been more stable.  Feels mood is 60% improved.     She stopped taking Lexapro 10mg QD after a couple days due to fatigue.  s/p Partial Inpatient Psych 10/12/22 - 10/13/22 for 1 day, as she wanted to pursue individual therapy.   Mood worse since  when tylor Tineo entered rehab, then  from drug overdose .  On Wellbutrin XL 300mg QD.  Good social supports.  No SI/HI/AH/VH.  Feels safe at home.  Sees counselor via Zoom every other weekly - next appt .  Attends [employer]oom child loss support group every other week since , which has been helpful - next appt .  She has been self-isolating by choice as she no longer prefers group settings.  Last saw Psychiatrist in early  for anxiety.  Previously on Prozac,  Zoloft, Paxil - unsure of why Rx was D/C, Lexapro D/C due to fatigue.  No higher dose of Wellbutrin previously.          PHQ-2/9 Depression Screening    Little interest or pleasure in doing things: 1 - several days  Feeling down, depressed, or hopeless: 1 - several days  Trouble falling or staying asleep, or sleeping too much: 0 - not at all  Feeling tired or having little energy: 0 - not at all  Poor appetite or overeatin - several days  Feeling bad about yourself - or that you are a failure or have let yourself or your family down: 0 - not at all  Trouble concentrating on things, such as reading the newspaper or watching television: 0 - not at all  Moving or speaking so slowly that other people could have noticed. Or the opposite - being so fidgety or restless that you have been moving around a lot more than usual: 0 - not at all  Thoughts that you would be better off dead, or of hurting yourself in some way: 0 - not at all  PHQ-9 Score: 3  PHQ-9 Interpretation: No or Minimal depression       SEN-7 Flowsheet Screening      Flowsheet Row Most Recent Value   Over the last two weeks, how often have you been bothered by the following problems?     Feeling nervous, anxious, or on edge 2   Not being able to stop or control worrying 1   Worrying too much about different things 2   Trouble relaxing  1   Being so restless that it's hard to sit still 0   Becoming easily annoyed or irritable  0   Feeling afraid as if something awful might happen 1   How difficult have these problems made it for you to do your work, take care of things at home, or get along with other people?  Not difficult at all   SEN Score  7                H/O ETOH Abuse - In remission.  No longer attends AA once weekly.  No sponsor.  Never attended rehab.  Last drink .     Migraine s Aura - She never started Cymbalta 60mg QD.  On Maxalt 10mg PRN.  Has HA prior to migraine.  Has associated nausea.  Occurs 1 (previously 1-2) times per month  (previously occurring every other month).  Occurs c menses.  Usually only need to take 1 Maxalt per daily x 2- 3 days.  She is taking 2-3 pills per month.  Previously on Imitrex - D/C due to chest pain.  No Neuro consult or brain imaging previously.        Hand Dermatitis - Stable.  Using Elocon PRN - every other week.        Vitamin D Deficiency - s/p Drisdol.  Taking MVI, but not OTC Vitamin D.      E- cigarette Smoker & Medial Marijuana - Contemplative.  Using Medical Marijuana vape at night for mood - 2-3 times weekly.  Advised non-inhalant forms of marijuana instead.                   From previous note:    Return in about 6 weeks (around 2024) for Virtual - F/U Anx/Dep, Migraine, Vit D Def, ECig, Labs.         Patient did not complete labs 23        HTN - Improved s/p ETOH cessation .  Previously on Metoprolol.  No BP check outside of office.  Watching diet.  Drinking 3-4 Ensure protein drinks daily due to lack of appetite since  when tylor Tineo entered rehab, then  from drug overdose Summer 2022.  Eating 2 meals daily.  +Exercise - Walking for 45 minutes, 2-3 times per week.       Depression / Anxiety - On Cymbalta 60mg QD x 6 weeks.  Mood is worse to to pending menses, but otherwise mood has been more stable.  Feels mood is 60% improved.       She stopped taking Lexapro 10mg QD after a couple days due to fatigue.  s/p Partial Inpatient Psych 10/12/22 - 10/13/22 for 1 day, as she wanted to pursue individual therapy.   Mood worse since  when tylor Tineo entered rehab, then  from drug overdose .  On Wellbutrin XL 300mg QD.  Poor social supports.  No SI/HI/AH/VH.  Feels safe at home.  Sees counselor via Zoom every other weekly - next appt 3/24.  Attends Zoom child loss support group every other week since , which has been helpful - next appt .  She has been self-isolating by choice as she no longer prefers group settings.  Last saw Psychiatrist in early  for anxiety.   Previously on Prozac, Zoloft, Paxil - unsure of why Rx was D/C, Lexapro D/C due to fatigue.  No higher dose of Wellbutrin previously.       PHQ-2/9 Depression Screening       Little interest or pleasure in doing things: 1 - several days  Feeling down, depressed, or hopeless: 2 - more than half the days  Trouble falling or staying asleep, or sleeping too much: 0 - not at all  Feeling tired or having little energy: 0 - not at all  Poor appetite or overeating: 3 - nearly every day  Feeling bad about yourself - or that you are a failure or have let yourself or your family down: 2 - more than half the days  Trouble concentrating on things, such as reading the newspaper or watching television: 1 - several days  Moving or speaking so slowly that other people could have noticed. Or the opposite - being so fidgety or restless that you have been moving around a lot more than usual: 1 - several days  Thoughts that you would be better off dead, or of hurting yourself in some way: 0 - not at all  PHQ-9 Score: 10  PHQ-9 Interpretation: Moderate depression                SEN-7 Flowsheet Screening    Flowsheet Row Most Recent Value   Over the last 2 weeks, how often have you been bothered by any of the following problems?     Feeling nervous, anxious, or on edge 3   Not being able to stop or control worrying 3   Worrying too much about different things 3   Trouble relaxing 3   Being so restless that it is hard to sit still 3   Becoming easily annoyed or irritable 2   Feeling afraid as if something awful might happen 3   SEN-7 Total Score 20                 H/O ETOH Abuse - In remission.  No longer attends AA once weekly.  No sponsor.  Never attended rehab.  Last drink 2019.     Migraine s Aura - On Maxalt 10mg PRN.  Has HA prior to migraine.  Has associated nausea.  Occurs Less than 9 (previously 1-2) times per month (previously occurring every other month).  Occurs c menses.  Usually only need to take 1 Maxalt per daily x 2- 3  days.  She is taking 2-3 pills per month.  Previously on Imitrex - D/C due to chest pain.  No Neuro consult or brain imaging previously.        Hand Dermatitis - Stable.  Using Elocon PRN - every other week.        Vitamin D Deficiency - s/p Drisdol.  Taking MVI, but not OTC Vitamin D.      E- cigarette Smoker & Medial Marijuana - Contemplative.  Using Medical Marijuana vape at night for mood.  Advised non-inhalant forms of marijuana instead.                From previous note:     Return in 6 weeks (on 2023) for 40min  F/U Anx/Dep, Migraine, Vit D Def, ECig, Labs - No appt scheduled.     4mo OV - Overdue     Patient did not complete labs 23        HTN - Improved s/p ETOH cessation .  Previously on Metoprolol.  No BP check outside of office.  Watching diet.  Drinking 3-4 Ensure protein drinks daily due to lack of appetite since  when tylor Tineo entered rehab, then  from drug overdose Summer 2022.  Eating 1-2 meals daily.  +Exercise - previously  Walking for 30 minutes, 2-3 times per week.       Depression / Anxiety - Mood improving after the holidays, 2nd holiday after sun's overdose death.  She stopped taking Lexapro 10mg QD after a couple days due to fatigue.  s/p Partial Inpatient Psych 10/12/22 - 10/13/22 for 1 day, as she wanted to pursue individual therapy.   Mood worse since  when tylor Tineo entered rehab, then  from drug overdose .  Decreased appetite.  On Wellbutrin XL 300mg QD.  Good social supports.  No SI/HI/AH/VH.  Feels safe at home.  Sees counselor via Zoom weekly - next appt .  Attends [employer] child loss support group every other week since , which has been helpful - next appt .  She has been self-isolating by choice as she no longer prefers group settings.  Last saw Psychiatrist in early  for anxiety.  Previously on Prozac, Zoloft, Paxil - unsure of why Rx was D/C, Lexapro D/C due to fatigue.  No higher dose of Wellbutrin previously.          PHQ-2/9  Depression Screening       Little interest or pleasure in doing things: 1 - several days  Feeling down, depressed, or hopeless: 2 - more than half the days  Trouble falling or staying asleep, or sleeping too much: 0 - not at all  Feeling tired or having little energy: 0 - not at all  Poor appetite or overeatin - more than half the days  Feeling bad about yourself - or that you are a failure or have let yourself or your family down: 1 - several days  Trouble concentrating on things, such as reading the newspaper or watching television: 1 - several days  Moving or speaking so slowly that other people could have noticed. Or the opposite - being so fidgety or restless that you have been moving around a lot more than usual: 1 - several days  Thoughts that you would be better off dead, or of hurting yourself in some way: 0 - not at all  PHQ-9 Score: 8  PHQ-9 Interpretation: Mild depression                  SEN-7 Flowsheet Screening    Flowsheet Row Most Recent Value   Over the last 2 weeks, how often have you been bothered by any of the following problems?     Feeling nervous, anxious, or on edge 3   Not being able to stop or control worrying 3   Worrying too much about different things 3   Trouble relaxing 2   Being so restless that it is hard to sit still 1   Becoming easily annoyed or irritable 1   Feeling afraid as if something awful might happen 3   SEN-7 Total Score 16                 H/O ETOH Abuse - In remission.  No longer attends AA once weekly.  No sponsor.  Never attended rehab.  Last drink .     Migraine s Aura - On Maxalt 10mg PRN.  Has HA prior to migraine..  Has associated nausea.  Occurs 1-2 times per month (previously occurring every other month).  Occurs c menses.  Usually only need to take 1 Maxalt per daily x 2- 3 days.  She is taking 2-3 pills per month.  Previously on Imitrex - D/C due to chest pain.  No Neuro consult or brain imaging previously.        Hand Dermatitis - Stable.  Using  Elocon PRN - every other week.        Vitamin D Deficiency - s/p Drisdol.  Taking MVI, but not OTC Vitamin D.      E- cigarette Smoker & Medial Marijuana - Contemplative.  Using Medical Marijuana vape at night for mood.  Advised non-inhalant forms of marijuana instead.          Reviewed:  Labs 10/18/22, Gyn 9/27/23, Chiro 11/28/22     Sees Gyn Ms. Melissa Ugalde PA-C at [facility].  Next appt 9/24     Sees Dentist q6 months.  Overdue for Optho.      The following portions of the patient's history were reviewed and updated as appropriate: allergies, current medications, past family history, past medical history, past social history, past surgical history and problem list.      Review of Systems   Constitutional:  Positive for appetite change. Negative for chills, diaphoresis (Increased), fatigue and fever.   Respiratory:  Negative for chest tightness and shortness of breath.    Cardiovascular:  Negative for chest pain.   Gastrointestinal:  Negative for abdominal pain, blood in stool, diarrhea, nausea and vomiting.   Genitourinary:  Negative for dysuria.   Neurological:  Negative for headaches.        Current Outpatient Medications   Medication Sig Dispense Refill    buPROPion (WELLBUTRIN XL) 300 mg 24 hr tablet TAKE 1 TABLET BY MOUTH DAILY 90 tablet 1    CANNABIDIOL PO Take by mouth Medical Marijuana Vape nightly for anxiety      clobetasol (TEMOVATE) 0.05 % cream Apply topically 2 (two) times a day as needed (eczema) 30 g 0    multivitamin (THERAGRAN) TABS Take 1 tablet by mouth daily      rizatriptan (MAXALT) 10 mg tablet TAKE 1 TABLET BY MOUTH DAILY AS NEEDED FOR MIGRAINE. MAY REPEAT IN 2 HOURS IF SYMPTOMS PERSIST : MAX TABLETS 3 PER 24 HOURS 9 tablet 3    tacrolimus (PROTOPIC) 0.1 % ointment Apply topically 2 (two) times a day To area around nose for eczema/rash 30 g 0     No current facility-administered medications for this visit.       Objective:  /66   Pulse 69   Temp 97.9 °F  "(36.6 °C)   Resp 16   Ht 5' 5\" (1.651 m)   Wt 50.7 kg (111 lb 12.8 oz)   SpO2 98%   BMI 18.60 kg/m²    Wt Readings from Last 3 Encounters:   09/10/24 50.7 kg (111 lb 12.8 oz)   07/11/24 53.7 kg (118 lb 6.4 oz)   04/15/24 55.3 kg (122 lb)      BP Readings from Last 3 Encounters:   09/10/24 100/66   07/11/24 112/70   04/15/24 102/70          Physical Exam  Vitals and nursing note reviewed.   Constitutional:       Appearance: Normal appearance. She is well-developed.   HENT:      Head: Normocephalic and atraumatic.   Eyes:      Conjunctiva/sclera: Conjunctivae normal.   Neck:      Thyroid: No thyromegaly.   Cardiovascular:      Rate and Rhythm: Normal rate and regular rhythm.      Pulses: Normal pulses.      Heart sounds: Normal heart sounds.   Pulmonary:      Effort: Pulmonary effort is normal.      Breath sounds: Normal breath sounds.   Abdominal:      General: Abdomen is flat. Bowel sounds are normal. There is no distension.      Palpations: Abdomen is soft. There is no mass.      Tenderness: There is no abdominal tenderness. There is no guarding or rebound.   Musculoskeletal:         General: No swelling or tenderness.      Cervical back: Neck supple.      Right lower leg: No edema.      Left lower leg: No edema.   Neurological:      General: No focal deficit present.      Mental Status: She is alert and oriented to person, place, and time. Mental status is at baseline.   Psychiatric:         Mood and Affect: Mood normal.         Behavior: Behavior normal.         Thought Content: Thought content normal.         Judgment: Judgment normal.         Lab Results:      Lab Results   Component Value Date    WBC 6.58 10/18/2022    HGB 13.6 10/18/2022    HCT 40.4 10/18/2022     10/18/2022    TRIG 86 10/18/2022    HDL 59 10/18/2022    LDLDIRECT 49 10/18/2022    ALT 14 10/18/2022    AST 15 10/18/2022     07/14/2015    K 3.8 10/18/2022     10/18/2022    CREATININE 0.69 10/18/2022    BUN 14 10/18/2022 " "   CO2 29 10/18/2022    TSH 1.50 11/10/2018    INR 1.03 07/14/2015    GLUF 91 10/18/2022    HGBA1C 5.1 10/18/2022     No results found for: \"URICACID\"  Invalid input(s): \"BASENAME\" Vitamin D    No results found.     POCT Labs        Depression Screening and Follow-up Plan: Patient was screened for depression during today's encounter. They screened negative with a PHQ-9 score of 3.                    "

## 2024-09-13 ENCOUNTER — CLINICAL SUPPORT (OUTPATIENT)
Dept: FAMILY MEDICINE CLINIC | Facility: CLINIC | Age: 44
End: 2024-09-13

## 2024-09-13 DIAGNOSIS — Z11.1 ENCOUNTER FOR PPD SKIN TEST READING: Primary | ICD-10-CM

## 2024-09-13 LAB
INDURATION: 0 MM
TB SKIN TEST: NEGATIVE

## 2024-12-14 DIAGNOSIS — G43.009 MIGRAINE WITHOUT AURA AND WITHOUT STATUS MIGRAINOSUS, NOT INTRACTABLE: ICD-10-CM

## 2024-12-15 RX ORDER — RIZATRIPTAN BENZOATE 10 MG/1
TABLET ORAL
Qty: 9 TABLET | Refills: 3 | Status: SHIPPED | OUTPATIENT
Start: 2024-12-15

## 2025-01-14 ENCOUNTER — TELEPHONE (OUTPATIENT)
Dept: FAMILY MEDICINE CLINIC | Facility: CLINIC | Age: 45
End: 2025-01-14

## 2025-03-10 DIAGNOSIS — F33.2 SEVERE EPISODE OF RECURRENT MAJOR DEPRESSIVE DISORDER, WITHOUT PSYCHOTIC FEATURES (HCC): ICD-10-CM

## 2025-03-11 RX ORDER — BUPROPION HYDROCHLORIDE 300 MG/1
300 TABLET ORAL DAILY
Qty: 90 TABLET | Refills: 1 | OUTPATIENT
Start: 2025-03-11

## 2025-03-11 NOTE — TELEPHONE ENCOUNTER
Refill declined.  Is pt still taking Rx because it would have  10/22/24?    She is overdue for labs and appt.  Clerical to please schedule -     Return in about 4 months (around 1/10/2025) for Physical / 4mo- Anx/Dep, Migraine, Vit D Def, ECig, Labs; 48-72 Hr PPD Read.

## 2025-03-12 RX ORDER — BUPROPION HYDROCHLORIDE 300 MG/1
300 TABLET ORAL DAILY
Qty: 30 TABLET | Refills: 1 | Status: SHIPPED | OUTPATIENT
Start: 2025-03-12

## 2025-03-12 NOTE — TELEPHONE ENCOUNTER
Patient called back and stated taking medication. Patient scheduled for 05/02 if medication could be refilled. Please advise. Thank you.

## 2025-03-13 NOTE — TELEPHONE ENCOUNTER
Clerical to please check that 5/3/25 appt is for 40min and update notes -     Physical / 4mo- Anx/Dep, Migraine, Vit D Def, ECig, Labs; 48-72 Hr PPD Read.     eRx sent.

## 2025-04-07 ENCOUNTER — NURSE TRIAGE (OUTPATIENT)
Age: 45
End: 2025-04-07

## 2025-04-07 NOTE — TELEPHONE ENCOUNTER
"FOLLOW UP: Appointment provided for tomorrow 4/8 at 10am    REASON FOR CONVERSATION: Vaginitis    SYMPTOMS: white, clumpy vaginal discharge, itching    OTHER: onset over weekend    DISPOSITION: See Within 3 Days in Office                Reason for Disposition   Symptoms of a yeast infection' (i.e., itchy, white discharge, not bad smelling) and not improved > 3 days following Care Advice     Does not meet protocol to treat over phone. Last seen 2023    Answer Assessment - Initial Assessment Questions  1. DISCHARGE: \"Describe the discharge.\" (e.g., white, yellow, green, gray, foamy, cottage cheese-like)      White clumpy  2. ODOR: \"Is there a bad odor?\"      Denies   3. ONSET: \"When did the discharge begin?\"      Over weekend  4. RASH: \"Is there a rash in the genital area?\" If Yes, ask: \"Describe it.\" (e.g., redness, blisters, sores, bumps)      denies  5. ABDOMEN PAIN: \"Are you having any abdomen pain?\" If Yes, ask: \"What does it feel like? \" (e.g., crampy, dull, intermittent, constant)       denies  6. ABDOMEN PAIN SEVERITY: If present, ask: \"How bad is it?\" (e.g., Scale 1-10; mild, moderate, or severe)      N/a  7. CAUSE: \"What do you think is causing the discharge?\" \"Have you had the same problem before?\" \"What happened then?\"      yeast  8. OTHER SYMPTOMS: \"Do you have any other symptoms?\" (e.g., fever, itching, vaginal bleeding, pain with urination, injury to genital area, vaginal foreign body)     itching  9. PREGNANCY: \"Is there any chance you are pregnant?\" \"When was your last menstrual period?\"      x    Protocols used: Vaginal Discharge-Adult-[place]    "

## 2025-04-07 NOTE — TELEPHONE ENCOUNTER
Regarding: Possible Yeast Infection  ----- Message from Arely TERESA sent at 4/7/2025  8:14 AM EDT -----  Pt states she believes she may have a yeast infection wanted to know if something can be called in for her. Pt has not been seen since 2023 she did call in for same reason in 2024. Tried to transfer no CTS currently available.

## 2025-05-21 ENCOUNTER — OFFICE VISIT (OUTPATIENT)
Age: 45
End: 2025-05-21
Payer: MEDICARE

## 2025-05-21 VITALS
HEIGHT: 64 IN | WEIGHT: 110.2 LBS | SYSTOLIC BLOOD PRESSURE: 110 MMHG | BODY MASS INDEX: 18.82 KG/M2 | DIASTOLIC BLOOD PRESSURE: 68 MMHG

## 2025-05-21 DIAGNOSIS — Z01.419 ENCOUNTER FOR ANNUAL ROUTINE GYNECOLOGICAL EXAMINATION: Primary | ICD-10-CM

## 2025-05-21 DIAGNOSIS — Z12.31 SCREENING MAMMOGRAM FOR BREAST CANCER: ICD-10-CM

## 2025-05-21 DIAGNOSIS — Z12.11 SCREENING FOR COLON CANCER: ICD-10-CM

## 2025-05-21 PROCEDURE — 99396 PREV VISIT EST AGE 40-64: CPT | Performed by: OBSTETRICS & GYNECOLOGY

## 2025-05-21 NOTE — PROGRESS NOTES
Anette BULLARD Jack  1980      CC:  Yearly exam    S:  45 y.o. female here for yearly exam.     Last period lasted almost 2 weeks.   This was first time.  If this continues she will follow up.      Period Duration (Days): up to 2 weeks  Period Pattern: (!) Irregular  Menstrual Flow: Heavy  Menstrual Control: Tampon, Maxi pad  Menstrual Control Change Freq (Hours): 2-3  Dysmenorrhea: (!) Moderate    She denies vaginal discharge, itching, pelvic pain.   She has no urinary concerns, does have occasional stress incontinence - not bothersome.  No bowel concerns.  No breast concerns.     Sexual activity: She is sexually active without pain, bleeding or dryness.   She is not interested in STD screening today.  Has noticed lower libido.     Medical marijuana at night for anxiety.     Contraception: She uses tubal ligation for contraception.     Last Pap: 9/27/23 - NILM, Neg HPV  Last Mammo:  2/18/23 - [facility]  No colon cancer screening yet.     We reviewed ASCCP guidelines for Pap testing today.     Family hx of breast cancer: no  Family hx of ovarian cancer: no  Family hx of colon cancer: no    Current Medications[1]  Problem List[2]  Past Medical History:   Diagnosis Date    Anxiety     Chlamydia infection     diagnosed with chlamydia in January 2017. treated and has tested negative since    Depression     Electronic cigarette use     Hand dermatitis     History of alcohol abuse     Hypertension     Medical marijuana use 3/17/2023    Migraine     Vitamin D deficiency      Family History   Problem Relation Age of Onset    Stroke Mother 55        Cause unknown    Congenital heart disease Mother     Valvular heart disease Mother         Mitral Valve    Hypertension Father     Diabetes type II Father     No Known Problems Daughter     No Known Problems Daughter     No Known Problems Maternal Grandmother     Stroke Maternal Grandfather     No Known Problems Paternal Grandmother     No Known Problems Paternal Grandfather      "Stroke Brother 26        Cause unknown    Depression Brother     Anxiety disorder Brother     Drug abuse Son         Oxycodone, Percocet, Fentanyl    Schizophrenia Maternal Uncle     No Known Problems Maternal Aunt     No Known Problems Maternal Aunt     No Known Problems Maternal Aunt     BRCA2 Positive Neg Hx     BRCA2 Negative Neg Hx     BRCA1 Positive Neg Hx     BRCA1 Negative Neg Hx     BRCA 1/2 Neg Hx     Ovarian cancer Neg Hx     Endometrial cancer Neg Hx     Colon cancer Neg Hx     Breast cancer additional onset Neg Hx     Breast cancer Neg Hx       Review of Systems   Respiratory: Negative.    Cardiovascular: Negative.    Gastrointestinal: Negative for constipation and diarrhea.     O:  Blood pressure 110/68, height 5' 4\" (1.626 m), weight 50 kg (110 lb 3.2 oz), last menstrual period 05/05/2025.    Patient appears well and is not in distress  Breasts are symmetrical without mass, tenderness, nipple discharge, skin changes or adenopathy.   Abdomen is soft and nontender without masses.   External genitals are normal without lesions or rashes.  Urethral meatus and urethra are normal  Bladder is normal to palpation  Vagina is normal without discharge or bleeding.   Cervix is normal without discharge or lesion.   Uterus is normal, mobile, nontender without palpable mass, retroverted.  Adnexa are normal, nontender, without palpable mass.     A:  Yearly exam.     P:   Pap & HPV up to date   Mammo ordered   Colon Cancer Screening referral placed   Discussed perimenopause.    RTO one year for yearly exam or sooner as needed.           [1]   Current Outpatient Medications:     buPROPion (WELLBUTRIN XL) 300 mg 24 hr tablet, Take 1 tablet (300 mg total) by mouth daily, Disp: 30 tablet, Rfl: 1    CANNABIDIOL PO, Take by mouth Medical Marijuana Vape nightly for anxiety, Disp: , Rfl:     clobetasol (TEMOVATE) 0.05 % cream, Apply topically 2 (two) times a day as needed (eczema), Disp: 30 g, Rfl: 0    multivitamin " (THERAGRAN) TABS, Take 1 tablet by mouth in the morning., Disp: , Rfl:     rizatriptan (MAXALT) 10 mg tablet, TAKE 1 TABLET BY MOUTH DAILY AS NEEDED FOR MIGRAINE. MAY REPEAT IN 2 HOURS IF SYMPTOMS PERSIST : MAX TABLETS 3 PER 24 HOURS, Disp: 9 tablet, Rfl: 3    tacrolimus (PROTOPIC) 0.1 % ointment, Apply topically 2 (two) times a day To area around nose for eczema/rash, Disp: 30 g, Rfl: 0  [2]   Patient Active Problem List  Diagnosis    Anxiety    Migraine    Vitamin D deficiency    Encounter for screening mammogram for malignant neoplasm of breast    Hand dermatitis    Depression    Electronic cigarette use    History of alcohol abuse    Medical marijuana use    Left lumbar radiculitis    Myofascial pain on left side    Segmental dysfunction of thoracic region    Segmental dysfunction of lumbar region    Sacroiliac joint somatic dysfunction    Chronic left-sided thoracic back pain

## 2025-06-10 ENCOUNTER — TELEPHONE (OUTPATIENT)
Age: 45
End: 2025-06-10

## 2025-06-12 NOTE — PROGRESS NOTES
"  Cardiology Consultation     Anette Santiago  [id number]  1980  [facility] CARDIOLOGY [place]  [address] [place] [id number]      1. Palpitation  POCT ECG    Zio Monitor      2. Electronic cigarette use        3. History of alcohol abuse        4. Anxiety        5. Severe episode of recurrent major depressive disorder, without psychotic features (HCC)            Discussion/Summary:  Palpitations  - Having intermittent episodes of palpitations  - Initially started by 20 years ago, but became worse over the last 1 to 2 years  - Her initial episodes of palpitations appears to be some type of SVT or other arrhythmia given how they abruptly stopped and started  - Palpitations now appear to be \"triggered by position changes and may be due to autonomic dysfunction  - Will check a 2-week Zio patch to evaluate for arrhythmias as a cause of her symptoms  - Also discussed autonomic dysfunction and recommendations with that including increasing her fluid intake, increasing her salt and electrolytes, wearing compression stockings and trying to exercise regularly  Tobacco use  - Former smoker, reports quitting smoking many years ago  - Denies any current tobacco use  History of alcohol abuse  - Reports rare alcohol consumption currently  Anxiety/depression  Migraines    Follow-up in 3 to 4 months.    History of Present Illness:  Anette Santiago is a 45 y.o. year old female with a past medical history of tobacco use, anxiety/depression and migraines.  She presents today for evaluation of palpitations.  These episodes initially started with 20 years ago and she was evaluated by cardiology at that time.  She reports having a heart monitor at that time and was told her palpitations were not anything serious or concerning.  At that time, she describes her palpitations as feeling her heart race for about 10 minutes.  The episode started and stopped abruptly.  No clear exacerbating relieving factors at that time.  They are " relatively rare and only occurred about 1-2 times a month.  Over the last 1 to 2 years, they have become more frequent.  Now they are occurring about 2-3 times per day.  She also reports these episodes are mainly triggered by her bending down and having her head go below the level of her heart..  Reports that sometimes occur at rest 2, but predominantly with activity.  She does report being under more stress recently especially since her son passed away about 3 years ago.  When the episodes of palpitations occur, she feels her heart racing and needs to sit down due to the discomfort.  Denies any episodes of lightheadedness/dizziness, syncope/near syncope, shortness of breath, or other concerning cardiac symptoms with the palpitations.  She also denies any chest pain or other cardiac symptoms with activity.  Other than the history of palpitations, no other significant past cardiac history.  Reports her mother had 2 open heart surgeries.  The first was at 10 years old for unclear reasons and the second 1 heart surgery was in her 50s for surgery on her mitral valve.  Reports remote history of tobacco use.  Acknowledges rare alcohol use.  Denies any illicit drug use.    Problem List[1]  Past Medical History[2]  Social History     Socioeconomic History    Marital status:      Spouse name: Not on file    Number of children: 3    Years of education: Not on file    Highest education level: High school graduate   Occupational History    Occupation:    Tobacco Use    Smoking status: Former     Current packs/day: 0.00     Average packs/day: 0.5 packs/day for 21.8 years (10.9 ttl pk-yrs)     Types: Cigarettes     Start date: 3/21/1995     Quit date: 2017     Years since quittin.4     Passive exposure: Never    Smokeless tobacco: Never   Vaping Use    Vaping status: Every Day    Start date: 2017    Substances: Nicotine, THC, Flavoring   Substance and Sexual Activity    Alcohol use: Not Currently      Comment: Sober since 2019 - Previously drinking heavily    Drug use: Yes     Types: Marijuana     Comment: medical card use every day    Sexual activity: Yes     Partners: Male     Birth control/protection: Female Sterilization   Other Topics Concern    Not on file   Social History Narrative        Lives with male partner, 2 daughters (shared custody 1/2 of the week with their father)    3 Children - 2 daughters, 1 son (decreased)    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, Retired as a Nanny     Social [employer]     Financial Resource Strain: Not on file   Food Insecurity: Not on file   Transportation Needs: Not on file   Physical Activity: Not on file   Stress: Not on file   Social Connections: Not on file   Intimate Partner Violence: Not on file   Housing Stability: Not on file      Family History[3]  Past Surgical History[4]  Current Medications[5]  Allergies   Allergen Reactions    Contrast [Iodinated Contrast Media] Itching     Throat Itching         Labs:  Lab Results   Component Value Date    ALT 14 10/18/2022    AST 15 10/18/2022    BUN 14 10/18/2022    CALCIUM 9.3 10/18/2022     10/18/2022    CO2 29 10/18/2022    CREATININE 0.69 10/18/2022    HDL 59 10/18/2022    HCT 40.4 10/18/2022    HGB 13.6 10/18/2022    HGBA1C 5.1 10/18/2022    MG 2.0 10/18/2022     10/18/2022    K 3.8 10/18/2022     2015    TRIG 86 10/18/2022    WBC 6.58 10/18/2022       Imaging: No results found.    EC2025: Normal sinus rhythm, incomplete RBBB    Review of Systems:  Review of Systems   Constitutional:  Negative for chills, diaphoresis, fatigue and fever.   HENT:  Negative for congestion.    Eyes:  Negative for photophobia and visual disturbance.   Respiratory:  Negative for chest tightness and shortness of breath.    Cardiovascular:  Positive for palpitations. Negative for chest pain and leg swelling.   Gastrointestinal:  Negative for abdominal distention, abdominal pain, diarrhea,  "nausea and vomiting.   Genitourinary:  Negative for difficulty urinating and dysuria.   Musculoskeletal:  Negative for arthralgias, gait problem and joint swelling.   Skin:  Negative for color change, pallor and rash.   Neurological:  Negative for dizziness, syncope, numbness and headaches.   Psychiatric/Behavioral:  Negative for agitation, behavioral problems and confusion.          Vitals:    06/13/25 1344   BP: 118/60   Pulse: 62      Vitals:    06/13/25 1344   Weight: 49.4 kg (108 lb 12.8 oz)     Height: 5' 4\" (162.6 cm)     Physical Exam:  General appearance:  Appears stated age, alert, well appearing and in no distress  HEENT:  PERRLA, EOMI, no scleral icterus, no conjunctival pallor  NECK:  Supple, No elevated JVP, no thyromegaly, no carotid bruits  HEART:  Regular rate and rhythm, normal S1/S2, no S3/S4, no murmur or rub  LUNGS:  Clear to auscultation bilaterally  ABDOMEN:  Soft, non-tender, positive bowel sounds, no rebound or guarding, no organomegaly   EXTREMITIES:  No edema  VASCULAR:  Normal pedal pulses   SKIN: No lesions or rashes on exposed skin  NEURO:  CN II-XII intact, no focal deficits          [1]   Patient Active Problem List  Diagnosis    Anxiety    Migraine    Vitamin D deficiency    Encounter for screening mammogram for malignant neoplasm of breast    Hand dermatitis    Depression    Electronic cigarette use    History of alcohol abuse    Medical marijuana use    Left lumbar radiculitis    Myofascial pain on left side    Segmental dysfunction of thoracic region    Segmental dysfunction of lumbar region    Sacroiliac joint somatic dysfunction    Chronic left-sided thoracic back pain   [2]   Past Medical History:  Diagnosis Date    Anxiety     Chlamydia infection     diagnosed with chlamydia in January 2017. treated and has tested negative since    Depression     Electronic cigarette use     Hand dermatitis     History of alcohol abuse     Hypertension     Medical marijuana use 3/17/2023    " Migraine     Vitamin D deficiency    [3]   Family History  Problem Relation Name Age of Onset    Stroke Mother Kavitha 55        Cause unknown    Congenital heart disease Mother Kavitha     Valvular heart disease Mother Kavitha         Mitral Valve    Hypertension Father Beau     Diabetes type II Father Beau     No Known Problems Daughter Hien     No Known Problems Daughter Leia     No Known Problems Maternal Grandmother      Stroke Maternal Grandfather Lam     No Known Problems Paternal Grandmother      No Known Problems Paternal Grandfather      Stroke Brother Silvio 26        Cause unknown    Depression Brother Williams     Anxiety disorder Brother Williams     Drug abuse Son Noman         Oxycodone, Percocet, Fentanyl    Schizophrenia Maternal Uncle Akira     No Known Problems Maternal Aunt      No Known Problems Maternal Aunt      No Known Problems Maternal Aunt      BRCA2 Positive Neg Hx      BRCA2 Negative Neg Hx      BRCA1 Positive Neg Hx      BRCA1 Negative Neg Hx      BRCA 1/2 Neg Hx      Ovarian cancer Neg Hx      Endometrial cancer Neg Hx      Colon cancer Neg Hx      Breast cancer additional onset Neg Hx      Breast cancer Neg Hx     [4]   Past Surgical History:  Procedure Laterality Date    CHOLECYSTECTOMY      TUBAL LIGATION      WISDOM TOOTH EXTRACTION  03/21/1998   [5]   Current Outpatient Medications:     buPROPion (WELLBUTRIN XL) 300 mg 24 hr tablet, TAKE ONE TABLET BY MOUTH EVERY DAY, Disp: 30 tablet, Rfl: 1    CANNABIDIOL PO, Take by mouth Medical Marijuana Vape nightly for anxiety, Disp: , Rfl:     clobetasol (TEMOVATE) 0.05 % cream, Apply topically 2 (two) times a day as needed (eczema), Disp: 30 g, Rfl: 0    multivitamin (THERAGRAN) TABS, Take 1 tablet by mouth in the morning., Disp: , Rfl:     rizatriptan (MAXALT) 10 mg tablet, TAKE 1 TABLET BY MOUTH DAILY AS NEEDED FOR MIGRAINE. MAY REPEAT IN 2 HOURS IF SYMPTOMS PERSIST : MAX TABLETS 3 PER 24 HOURS, Disp: 9 tablet, Rfl: 3    tacrolimus  (PROTOPIC) 0.1 % ointment, Apply topically 2 (two) times a day To area around nose for eczema/rash, Disp: 30 g, Rfl: 0

## 2025-06-13 ENCOUNTER — OFFICE VISIT (OUTPATIENT)
Dept: CARDIOLOGY CLINIC | Facility: CLINIC | Age: 45
End: 2025-06-13
Payer: MEDICARE

## 2025-06-13 VITALS
WEIGHT: 108.8 LBS | HEART RATE: 62 BPM | DIASTOLIC BLOOD PRESSURE: 60 MMHG | HEIGHT: 64 IN | SYSTOLIC BLOOD PRESSURE: 118 MMHG | BODY MASS INDEX: 18.57 KG/M2

## 2025-06-13 DIAGNOSIS — F33.2 SEVERE EPISODE OF RECURRENT MAJOR DEPRESSIVE DISORDER, WITHOUT PSYCHOTIC FEATURES (HCC): ICD-10-CM

## 2025-06-13 DIAGNOSIS — R00.2 PALPITATION: Primary | ICD-10-CM

## 2025-06-13 DIAGNOSIS — Z78.9 ELECTRONIC CIGARETTE USE: ICD-10-CM

## 2025-06-13 DIAGNOSIS — F41.9 ANXIETY: ICD-10-CM

## 2025-06-13 DIAGNOSIS — F10.11 HISTORY OF ALCOHOL ABUSE: ICD-10-CM

## 2025-06-13 PROCEDURE — 93000 ELECTROCARDIOGRAM COMPLETE: CPT | Performed by: STUDENT IN AN ORGANIZED HEALTH CARE EDUCATION/TRAINING PROGRAM

## 2025-06-13 PROCEDURE — 99204 OFFICE O/P NEW MOD 45 MIN: CPT | Performed by: STUDENT IN AN ORGANIZED HEALTH CARE EDUCATION/TRAINING PROGRAM

## 2025-08-15 ENCOUNTER — NURSE TRIAGE (OUTPATIENT)
Age: 45
End: 2025-08-15